# Patient Record
Sex: FEMALE | Race: WHITE | Employment: FULL TIME | URBAN - METROPOLITAN AREA
[De-identification: names, ages, dates, MRNs, and addresses within clinical notes are randomized per-mention and may not be internally consistent; named-entity substitution may affect disease eponyms.]

---

## 2023-02-24 LAB
BASOPHILS (10*3/UL) IN BLOOD BY AUTOMATED COUNT: 0.05 X10E9/L (ref 0–0.1)
BASOPHILS/100 LEUKOCYTES IN BLOOD BY AUTOMATED COUNT: 1 % (ref 0–2)
EOSINOPHILS (10*3/UL) IN BLOOD BY AUTOMATED COUNT: 0.06 X10E9/L (ref 0–0.7)
EOSINOPHILS/100 LEUKOCYTES IN BLOOD BY AUTOMATED COUNT: 1.2 % (ref 0–6)
ERYTHROCYTE DISTRIBUTION WIDTH (RATIO) BY AUTOMATED COUNT: 14.4 % (ref 11.5–14.5)
ERYTHROCYTE MEAN CORPUSCULAR HEMOGLOBIN CONCENTRATION (G/DL) BY AUTOMATED: 32.4 G/DL (ref 32–36)
ERYTHROCYTE MEAN CORPUSCULAR VOLUME (FL) BY AUTOMATED COUNT: 97 FL (ref 80–100)
ERYTHROCYTES (10*6/UL) IN BLOOD BY AUTOMATED COUNT: 4.06 X10E12/L (ref 4–5.2)
HEMATOCRIT (%) IN BLOOD BY AUTOMATED COUNT: 39.5 % (ref 36–46)
HEMOGLOBIN (G/DL) IN BLOOD: 12.8 G/DL (ref 12–16)
IMMATURE GRANULOCYTES/100 LEUKOCYTES IN BLOOD BY AUTOMATED COUNT: 0.2 % (ref 0–0.9)
LEUKOCYTES (10*3/UL) IN BLOOD BY AUTOMATED COUNT: 5.1 X10E9/L (ref 4.4–11.3)
LYMPHOCYTES (10*3/UL) IN BLOOD BY AUTOMATED COUNT: 1.43 X10E9/L (ref 1.2–4.8)
LYMPHOCYTES/100 LEUKOCYTES IN BLOOD BY AUTOMATED COUNT: 28.1 % (ref 13–44)
MONOCYTES (10*3/UL) IN BLOOD BY AUTOMATED COUNT: 0.51 X10E9/L (ref 0.1–1)
MONOCYTES/100 LEUKOCYTES IN BLOOD BY AUTOMATED COUNT: 10 % (ref 2–10)
NEUTROPHILS (10*3/UL) IN BLOOD BY AUTOMATED COUNT: 3.02 X10E9/L (ref 1.2–7.7)
NEUTROPHILS/100 LEUKOCYTES IN BLOOD BY AUTOMATED COUNT: 59.5 % (ref 40–80)
PLATELETS (10*3/UL) IN BLOOD AUTOMATED COUNT: 231 X10E9/L (ref 150–450)

## 2023-05-19 LAB
ALANINE AMINOTRANSFERASE (SGPT) (U/L) IN SER/PLAS: 19 U/L (ref 7–45)
ALBUMIN (G/DL) IN SER/PLAS: 4.9 G/DL (ref 3.4–5)
ALKALINE PHOSPHATASE (U/L) IN SER/PLAS: 84 U/L (ref 33–110)
ANION GAP IN SER/PLAS: 11 MMOL/L (ref 10–20)
ASPARTATE AMINOTRANSFERASE (SGOT) (U/L) IN SER/PLAS: 15 U/L (ref 9–39)
BETA HYDROXYBUTYRATE (MMOL/L) IN SER/PLAS: 0.15 MMOL/L (ref 0.02–0.27)
BILIRUBIN TOTAL (MG/DL) IN SER/PLAS: 0.6 MG/DL (ref 0–1.2)
C PEPTIDE (NG/ML) IN SER/PLAS: 0.9 NG/ML (ref 0.7–3.9)
CALCIUM (MG/DL) IN SER/PLAS: 9.5 MG/DL (ref 8.6–10.3)
CARBON DIOXIDE, TOTAL (MMOL/L) IN SER/PLAS: 25 MMOL/L (ref 21–32)
CHLORIDE (MMOL/L) IN SER/PLAS: 105 MMOL/L (ref 98–107)
CREATININE (MG/DL) IN SER/PLAS: 0.8 MG/DL (ref 0.5–1.05)
GFR FEMALE: >90 ML/MIN/1.73M2
GLUCOSE (MG/DL) IN SER/PLAS: 73 MG/DL (ref 74–99)
INSULIN: 6 UIU/ML (ref 3–25)
POTASSIUM (MMOL/L) IN SER/PLAS: 3.7 MMOL/L (ref 3.5–5.3)
PROTEIN TOTAL: 7.9 G/DL (ref 6.4–8.2)
SODIUM (MMOL/L) IN SER/PLAS: 137 MMOL/L (ref 136–145)
THYROTROPIN (MIU/L) IN SER/PLAS BY DETECTION LIMIT <= 0.05 MIU/L: 2.8 MIU/L (ref 0.44–3.98)
UREA NITROGEN (MG/DL) IN SER/PLAS: 9 MG/DL (ref 6–23)

## 2023-05-23 LAB — GLUTAMATE DECARBOXYLASE 65 AB (U/ML) IN SERUM: 0 NMOL/L

## 2023-05-25 LAB
ISLET ANTIGEN-2 ANTIBODY: <5.4 U/ML (ref 0–7.4)
PROINSULIN, INTACT: <1.6 PMOL/L

## 2023-05-30 LAB
CHLORPROPAMIDE: NORMAL MCG/ML
GLIMEPIRIDE: NORMAL NG/ML
GLIPIZIDE: NORMAL NG/ML
GLYBURIDE: NORMAL NG/ML
NATEGLINIDE: NORMAL MCG/ML
PIOGLITAZONE: NORMAL NG/ML
REPAGLINIDE: NORMAL NG/ML
ROSIGLITAZONE: NORMAL NG/ML
TOLAZAMIDE: NORMAL MCG/ML
TOLBUTAMIDE: NORMAL MCG/ML

## 2023-06-13 DIAGNOSIS — F41.9 ANXIETY: Primary | ICD-10-CM

## 2023-06-13 PROBLEM — K21.9 GERD (GASTROESOPHAGEAL REFLUX DISEASE): Status: ACTIVE | Noted: 2023-06-13

## 2023-06-13 PROBLEM — D72.819 WBC DECREASED: Status: RESOLVED | Noted: 2023-06-13 | Resolved: 2023-06-13

## 2023-06-13 PROBLEM — E04.9 ENLARGED THYROID: Status: ACTIVE | Noted: 2023-06-13

## 2023-06-13 PROBLEM — E06.3 HASHIMOTO'S DISEASE: Status: ACTIVE | Noted: 2023-06-13

## 2023-06-13 PROBLEM — G43.009 MIGRAINE WITHOUT AURA AND RESPONSIVE TO TREATMENT: Status: ACTIVE | Noted: 2023-06-13

## 2023-06-13 PROBLEM — R79.89 ELEVATED TSH: Status: ACTIVE | Noted: 2023-06-13

## 2023-06-13 RX ORDER — FLUOXETINE HYDROCHLORIDE 20 MG/1
1 CAPSULE ORAL DAILY
COMMUNITY
Start: 2022-10-07 | End: 2023-06-13 | Stop reason: SDUPTHER

## 2023-06-13 RX ORDER — FLUOXETINE HYDROCHLORIDE 20 MG/1
20 CAPSULE ORAL DAILY
Qty: 90 CAPSULE | Refills: 1 | Status: SHIPPED | OUTPATIENT
Start: 2023-06-13 | End: 2023-06-23

## 2023-06-23 ENCOUNTER — OFFICE VISIT (OUTPATIENT)
Dept: PRIMARY CARE | Facility: CLINIC | Age: 28
End: 2023-06-23
Payer: COMMERCIAL

## 2023-06-23 VITALS
HEIGHT: 68 IN | RESPIRATION RATE: 18 BRPM | OXYGEN SATURATION: 98 % | TEMPERATURE: 97.8 F | DIASTOLIC BLOOD PRESSURE: 82 MMHG | WEIGHT: 159 LBS | HEART RATE: 81 BPM | BODY MASS INDEX: 24.1 KG/M2 | SYSTOLIC BLOOD PRESSURE: 118 MMHG

## 2023-06-23 DIAGNOSIS — F41.9 ANXIETY: Primary | ICD-10-CM

## 2023-06-23 PROCEDURE — 99214 OFFICE O/P EST MOD 30 MIN: CPT | Performed by: FAMILY MEDICINE

## 2023-06-23 PROCEDURE — 1036F TOBACCO NON-USER: CPT | Performed by: FAMILY MEDICINE

## 2023-06-23 RX ORDER — RIZATRIPTAN BENZOATE 10 MG/1
TABLET ORAL
COMMUNITY
End: 2023-10-19 | Stop reason: SDUPTHER

## 2023-06-23 RX ORDER — CYCLOBENZAPRINE HCL 5 MG
224640 TABLET ORAL NIGHTLY
COMMUNITY
End: 2023-10-19 | Stop reason: SDUPTHER

## 2023-06-23 RX ORDER — FLUTICASONE PROPIONATE 50 MCG
2 SPRAY, SUSPENSION (ML) NASAL
COMMUNITY
Start: 2013-11-27

## 2023-06-23 RX ORDER — FLUOXETINE HYDROCHLORIDE 40 MG/1
40 CAPSULE ORAL DAILY
Qty: 30 CAPSULE | Refills: 11 | Status: SHIPPED | OUTPATIENT
Start: 2023-06-23 | End: 2024-06-22

## 2023-06-23 RX ORDER — LORATADINE 10 MG/1
1 TABLET ORAL DAILY PRN
COMMUNITY
Start: 2022-08-25

## 2023-06-23 ASSESSMENT — ENCOUNTER SYMPTOMS
HEADACHES: 0
SHORTNESS OF BREATH: 0

## 2023-06-23 NOTE — PROGRESS NOTES
"Subjective     Bertha Esposito is a 28 y.o. female who presents for Med Refill.    Med Refill  Pertinent negatives include no chest pain or headaches.      Pt is doing well. She was started on prozac 20 mg in Oct. 2022 for depression. Her anxiety has increased due to work.  She is interested in increasing dose.  She denies depression.  She has trouble sleeping due to anxiety.  No side effects to prozac.      She is getting botox for her migraines through her neurologist which has been helping.     She sees Endo NP for her low blood sugars.  She had labs done recently in May 2023.      Review of Systems   Respiratory:  Negative for shortness of breath.    Cardiovascular:  Negative for chest pain.   Neurological:  Negative for headaches.       Objective     Vitals:    06/23/23 1127   BP: 118/82   BP Location: Right arm   Patient Position: Sitting   Pulse: 81   Resp: 18   Temp: 36.6 °C (97.8 °F)   TempSrc: Temporal   SpO2: 98%   Weight: 72.1 kg (159 lb)   Height: 1.727 m (5' 8\")        Current Outpatient Medications   Medication Instructions    cyclobenzaprine (FLEXERIL) 224,640 mg, oral, Nightly    FLUoxetine (PROZAC) 40 mg, oral, Daily    fluticasone (Flonase) 50 mcg/actuation nasal spray 2 sprays, nasal, Daily RT    loratadine (Claritin) 10 mg tablet 1 tablet, oral, Daily PRN    onabotulinumtoxinA (Botox) 100 unit injection 200 units for migraine    rizatriptan (Maxalt) 10 mg tablet TAKE 1 TABLET BY MOUTH AT ONSET OF HEADACHE. MAY REPEAT EVERY 2 HOURS AS NEEDED. MAXIMUM OF 3 TABLETS IN 24 HOURS.        Past Surgical History:   Procedure Laterality Date    OTHER SURGICAL HISTORY  02/04/2021    No history of surgery        Social History     Tobacco Use    Smoking status: Never    Smokeless tobacco: Never        No family history on file.     Immunization History   Administered Date(s) Administered    HPV 9-Valent 09/15/2017    Influenza, injectable, quadrivalent, preservative free 10/07/2022    Pfizer Gray Cap " SARS-CoV-2 08/18/2022    Pfizer Purple Cap SARS-CoV-2 03/31/2021, 04/21/2021, 11/09/2021        Physical Exam  Vitals reviewed.   Constitutional:       General: She is not in acute distress.     Appearance: Normal appearance. She is well-developed.   HENT:      Head: Normocephalic and atraumatic.      Nose: Nose normal.   Eyes:      General: Lids are normal.      Extraocular Movements: Extraocular movements intact.      Conjunctiva/sclera: Conjunctivae normal.      Right eye: Right conjunctiva is not injected.      Left eye: Left conjunctiva is not injected.   Cardiovascular:      Rate and Rhythm: Normal rate and regular rhythm.      Heart sounds: No murmur heard.  Pulmonary:      Effort: Pulmonary effort is normal. No respiratory distress.      Breath sounds: Normal breath sounds. No wheezing, rhonchi or rales.   Lymphadenopathy:      Cervical: No cervical adenopathy.   Skin:     General: Skin is warm and dry.      Findings: No rash.   Neurological:      Mental Status: She is alert and oriented to person, place, and time. Mental status is at baseline.   Psychiatric:         Mood and Affect: Mood normal.         Behavior: Behavior normal.         Problem List Items Addressed This Visit       Anxiety - Primary    Relevant Medications    FLUoxetine (PROzac) 40 mg capsule       Assessment/Plan     anxiety - not well controlled, increase to 40 mg daily     migraines - sees  neuro - botox - well controlled     f/u 1 month for update

## 2023-07-10 ENCOUNTER — APPOINTMENT (OUTPATIENT)
Dept: PRIMARY CARE | Facility: CLINIC | Age: 28
End: 2023-07-10
Payer: COMMERCIAL

## 2023-10-19 DIAGNOSIS — G43.009 MIGRAINE WITHOUT AURA AND RESPONSIVE TO TREATMENT: Primary | ICD-10-CM

## 2023-10-19 RX ORDER — RIZATRIPTAN BENZOATE 10 MG/1
TABLET ORAL
Qty: 9 TABLET | Refills: 0 | Status: SHIPPED | OUTPATIENT
Start: 2023-10-19 | End: 2023-11-08 | Stop reason: SDUPTHER

## 2023-10-19 RX ORDER — CYCLOBENZAPRINE HCL 5 MG
5 TABLET ORAL NIGHTLY PRN
Qty: 30 TABLET | Refills: 0 | Status: SHIPPED | OUTPATIENT
Start: 2023-10-19 | End: 2023-11-08 | Stop reason: SDUPTHER

## 2023-10-19 NOTE — TELEPHONE ENCOUNTER
Pt called to ask if Dr. Wang would be able to refill her migraine medications flexeril and maxalt.  Her neurologist left the practice and they do not have someone for her to see.  She is trying to establish with a new neurologist but she needs the medication in the meantime.  Dr. Danie Olson was her neurologist.  If possible please send to [facility] in [place].

## 2023-10-23 ENCOUNTER — TELEPHONE (OUTPATIENT)
Dept: NEUROLOGY | Facility: CLINIC | Age: 28
End: 2023-10-23
Payer: COMMERCIAL

## 2023-10-23 DIAGNOSIS — G43.009 MIGRAINE WITHOUT AURA AND RESPONSIVE TO TREATMENT: ICD-10-CM

## 2023-10-23 RX ORDER — RIZATRIPTAN BENZOATE 10 MG/1
TABLET ORAL
Qty: 9 TABLET | Refills: 0 | OUTPATIENT
Start: 2023-10-23

## 2023-11-08 ENCOUNTER — TELEMEDICINE (OUTPATIENT)
Dept: NEUROLOGY | Facility: CLINIC | Age: 28
End: 2023-11-08
Payer: COMMERCIAL

## 2023-11-08 DIAGNOSIS — G43.009 MIGRAINE WITHOUT AURA AND RESPONSIVE TO TREATMENT: Primary | ICD-10-CM

## 2023-11-08 PROCEDURE — 99214 OFFICE O/P EST MOD 30 MIN: CPT | Performed by: NURSE PRACTITIONER

## 2023-11-08 RX ORDER — CYCLOBENZAPRINE HCL 5 MG
5-10 TABLET ORAL NIGHTLY PRN
Qty: 30 TABLET | Refills: 3 | Status: SHIPPED | OUTPATIENT
Start: 2023-11-08

## 2023-11-08 RX ORDER — RIZATRIPTAN BENZOATE 10 MG/1
TABLET ORAL
Qty: 9 TABLET | Refills: 3 | Status: SHIPPED | OUTPATIENT
Start: 2023-11-08 | End: 2024-03-01 | Stop reason: SDUPTHER

## 2023-11-08 NOTE — PROGRESS NOTES
Being assessed today for follow-up of migraine.  She reports that the intensity and frequency of her breakthrough migraines has significantly decreased.  On average should be a couple times a month until it is closer to time for her next Botox treatment.  When it comes closer to that time she does get them more frequently.  She does utilize the rizatriptan which is effective.  Due to her jaw clenching she uses the cyclobenzaprine at at bedtime to help relax the muscles so that that does not trigger migraine as well.  Continue the rizatriptan and cyclobenzaprine as she has been taking them.  Also continue her Botox as recommended.  Discussed role of medicine,  importance of taking medications, potential risks, benefits, and precautions to be taken.  Reviewed sleep hygiene and dietary modifications.  Follow-up in 6 months.    This note was created with voice recognition software and was not corrected for typographical or grammatical errors

## 2023-11-28 ENCOUNTER — APPOINTMENT (OUTPATIENT)
Dept: PRIMARY CARE | Facility: CLINIC | Age: 28
End: 2023-11-28
Payer: COMMERCIAL

## 2023-11-29 ENCOUNTER — PROCEDURE VISIT (OUTPATIENT)
Dept: NEUROLOGY | Facility: CLINIC | Age: 28
End: 2023-11-29
Payer: COMMERCIAL

## 2023-11-29 VITALS — RESPIRATION RATE: 16 BRPM | HEART RATE: 96 BPM | SYSTOLIC BLOOD PRESSURE: 108 MMHG | DIASTOLIC BLOOD PRESSURE: 70 MMHG

## 2023-11-29 DIAGNOSIS — G43.711 CHRONIC MIGRAINE WITHOUT AURA, INTRACTABLE, WITH STATUS MIGRAINOSUS: Primary | ICD-10-CM

## 2023-11-29 PROCEDURE — 64615 CHEMODENERV MUSC MIGRAINE: CPT | Performed by: PSYCHIATRY & NEUROLOGY

## 2023-11-29 RX ORDER — CETIRIZINE HYDROCHLORIDE 10 MG/1
10 TABLET ORAL DAILY PRN
COMMUNITY
End: 2023-12-15

## 2023-12-01 NOTE — PROGRESS NOTES
Patient ID: Bertha Esposito is a 28 y.o. female.    Head/Face/Jaw Botulinum Injection    Date/Time: 12/1/2023 11:10 AM    Performed by: Niya Colunga MD  Authorized by: Niya Colunga MD      Consent:      Consent obtained:  Verbal     Consent given by:  Patient    Procedure details:      EMG used?  No     Electrical stimulation used?  No     Diluted by:  Preservative free saline     Toxin (Brand):  OnaBoNT-A (Botox)     Total units available:  200       Ad hoc region injected:  Head see diagram with 200 units     Total units injected:  200     Total units wasted:  0    Post-procedure details:      Patient tolerance of procedure:  Tolerated well, no immediate complications    Comments: Please do not rub areas for 24 hours.  No pressure above eyebrows for 24 hours.  Watch out for helmets, headlamps, headbands, goggles, or massage for 24 hours.  If there is discomfort, ice for the first 24 hour,s heat after that.  Headaches may worsen, or you may experience neck stiffness.  If this occurs use your usual headache medication or a mild anti inflammatory such as advil or aleve.  Please call if you have difficulty swallowing.

## 2023-12-15 ENCOUNTER — OFFICE VISIT (OUTPATIENT)
Dept: PRIMARY CARE | Facility: CLINIC | Age: 28
End: 2023-12-15
Payer: COMMERCIAL

## 2023-12-15 VITALS
DIASTOLIC BLOOD PRESSURE: 78 MMHG | HEIGHT: 68 IN | BODY MASS INDEX: 24.1 KG/M2 | RESPIRATION RATE: 18 BRPM | OXYGEN SATURATION: 98 % | SYSTOLIC BLOOD PRESSURE: 119 MMHG | TEMPERATURE: 98.2 F | HEART RATE: 79 BPM | WEIGHT: 159 LBS

## 2023-12-15 DIAGNOSIS — F41.9 ANXIETY: Primary | ICD-10-CM

## 2023-12-15 PROCEDURE — 1036F TOBACCO NON-USER: CPT | Performed by: FAMILY MEDICINE

## 2023-12-15 PROCEDURE — 90471 IMMUNIZATION ADMIN: CPT | Performed by: FAMILY MEDICINE

## 2023-12-15 PROCEDURE — 99214 OFFICE O/P EST MOD 30 MIN: CPT | Performed by: FAMILY MEDICINE

## 2023-12-15 PROCEDURE — 90686 IIV4 VACC NO PRSV 0.5 ML IM: CPT | Performed by: FAMILY MEDICINE

## 2023-12-15 RX ORDER — HYDROXYZINE HYDROCHLORIDE 10 MG/1
10 TABLET, FILM COATED ORAL EVERY 6 HOURS PRN
Qty: 30 TABLET | Refills: 1 | Status: SHIPPED | OUTPATIENT
Start: 2023-12-15

## 2023-12-15 ASSESSMENT — ENCOUNTER SYMPTOMS
SHORTNESS OF BREATH: 0
HEADACHES: 0

## 2023-12-15 NOTE — PROGRESS NOTES
"Subjective     Bertha Esposito is a 28 y.o. female who presents for Anxiety.    Anxiety  Patient reports no chest pain or shortness of breath.        Anxiety overall has improved but patient does have episodic anxiety spells and is requesting increase in prozac or addition of an anti anxiety medication to help her as needed.      Review of Systems   Respiratory:  Negative for shortness of breath.    Cardiovascular:  Negative for chest pain.   Neurological:  Negative for headaches.       Objective     Vitals:    12/15/23 1514   BP: 119/78   BP Location: Right arm   Patient Position: Sitting   Pulse: 79   Resp: 18   Temp: 36.8 °C (98.2 °F)   TempSrc: Temporal   SpO2: 98%   Weight: 72.1 kg (159 lb)   Height: 1.727 m (5' 8\")        Current Outpatient Medications   Medication Instructions    aspirin-acetaminophen-caffeine (Excedrin Migraine) 250-250-65 mg tablet oral    cyclobenzaprine (FLEXERIL) 5-10 mg, oral, Nightly PRN    FLUoxetine (PROZAC) 40 mg, oral, Daily    fluticasone (Flonase) 50 mcg/actuation nasal spray 2 sprays, nasal, Daily RT    hydrOXYzine HCL (ATARAX) 10 mg, oral, Every 6 hours PRN    loratadine (Claritin) 10 mg tablet 1 tablet, oral, Daily PRN    onabotulinumtoxinA (Botox) 100 unit injection 200 units for migraine    rizatriptan (Maxalt) 10 mg tablet TAKE 1 TABLET BY MOUTH AT ONSET OF HEADACHE. MAY REPEAT EVERY 2 HOURS AS NEEDED. MAXIMUM OF 3 TABLETS IN 24 HOURS.        Past Surgical History:   Procedure Laterality Date    OTHER SURGICAL HISTORY  02/04/2021    No history of surgery        Social History     Tobacco Use    Smoking status: Never    Smokeless tobacco: Never        No family history on file.     Immunization History   Administered Date(s) Administered    Flu vaccine (IIV4), preservative free *Check age/dose* 10/07/2022, 12/15/2023    HPV 9-valent vaccine (GARDASIL 9) 09/15/2017    Pfizer Gray Cap SARS-CoV-2 08/18/2022    Pfizer Purple Cap SARS-CoV-2 03/31/2021, 04/21/2021, 11/09/2021    "     Physical Exam  Vitals reviewed.   Constitutional:       General: She is not in acute distress.     Appearance: Normal appearance. She is well-developed.   HENT:      Head: Normocephalic and atraumatic.   Eyes:      General: Lids are normal.      Conjunctiva/sclera:      Right eye: Right conjunctiva is not injected.      Left eye: Left conjunctiva is not injected.   Cardiovascular:      Rate and Rhythm: Normal rate and regular rhythm.      Heart sounds: No murmur heard.  Pulmonary:      Effort: Pulmonary effort is normal. No respiratory distress.      Breath sounds: Normal breath sounds. No wheezing, rhonchi or rales.   Skin:     General: Skin is warm and dry.      Findings: No rash.   Neurological:      Mental Status: She is alert and oriented to person, place, and time. Mental status is at baseline.   Psychiatric:         Mood and Affect: Mood normal.         Behavior: Behavior normal.         Problem List Items Addressed This Visit       Anxiety - Primary    Relevant Medications    hydrOXYzine HCL (Atarax) 10 mg tablet       Assessment/Plan     Anxiety - improved, but still having situational anxiety. Continue Prozac 40 mg daily;  I will start hydroxyzine 10 mg 1-2 tabs every 6 hours as needed.  The goals of therapy, medication dose, frequency and potential side effects were discussed with patient today.  The patient is agreeable to taking the medication as prescribed.       Follow up 3-4 months or sooner if needed

## 2023-12-26 DIAGNOSIS — E16.2 HYPOGLYCEMIA: ICD-10-CM

## 2023-12-27 NOTE — PROGRESS NOTES
follow up for low blood sugar readings. LV with me 05/2023.     History of Present Illness  28 y.o. female  with Hashimoto's disease (not requiring LT4 replacement) here for follow-up of low blood sugar readings.     Needs to eat every 1.5 to 2 hours  Symptoms: anxiety, coburn, headaches,dizziness, shakiness  Always thirsty  Started: last few years  Time of day: during the day  Timing in relation to food: 1.5 - 2 hours after eating     Monitoring BG with glucose meter.  Fasting high 60s-90s  During the day when eating regularly: 90s-100s  During symptoms: mid 50s to 70s     Labs from 5/19/2023 (fasting)  Serum glucose: 73  insulin: 6  c-peptide: 0.9  BHB: 0.15     TSH 2.80     Has to eat every 2 hours or becomes symptomatic.  Will have symptoms above if she does not eat for more than 2 hours.  Triggers migraines as well.  Always very thirsty (for the past several years)    ROS  General: no fever or chills  CV: no chest pain   Respiratory: no shortness of breath  MSK: no lower extremity edema  Neuro: no headache or dizziness  See HPI for Endocrine ROS    Past Medical History:   Diagnosis Date    WBC decreased 06/13/2023       Past Surgical History:   Procedure Laterality Date    OTHER SURGICAL HISTORY  02/04/2021    No history of surgery       Social History     Socioeconomic History    Marital status:      Spouse name: Not on file    Number of children: Not on file    Years of education: Not on file    Highest education level: Not on file   Occupational History    Not on file   Tobacco Use    Smoking status: Never    Smokeless tobacco: Never   Substance and Sexual Activity    Alcohol use: Not on file    Drug use: Not on file    Sexual activity: Not on file   Other Topics Concern    Not on file   Social History Narrative    Not on file     Social Determinants of Health     Financial Resource Strain: Not on file   Food Insecurity: Not on file   Transportation Needs: Not on file   Physical Activity: Not on file    Stress: Not on file   Social Connections: Not on file   Intimate Partner Violence: Not on file   Housing Stability: Not on file       Physical Exam   vitals were not taken for this visit.   General: not in acute distress  HEENT: LUZM ARIA WEBBER  Thyroid: no goiter  Neuro: alert and oriented x 3    Current Outpatient Medications   Medication Sig Dispense Refill    aspirin-acetaminophen-caffeine (Excedrin Migraine) 250-250-65 mg tablet Take by mouth.      cyclobenzaprine (Flexeril) 5 mg tablet Take 1-2 tablets (5-10 mg) by mouth as needed at bedtime for muscle spasms. 30 tablet 3    FLUoxetine (PROzac) 40 mg capsule Take 1 capsule (40 mg) by mouth once daily. 30 capsule 11    fluticasone (Flonase) 50 mcg/actuation nasal spray Administer 2 sprays into affected nostril(s) once daily.      hydrOXYzine HCL (Atarax) 10 mg tablet Take 1 tablet (10 mg) by mouth every 6 hours if needed for anxiety. 30 tablet 1    loratadine (Claritin) 10 mg tablet Take 1 tablet (10 mg) by mouth once daily as needed.      onabotulinumtoxinA (Botox) 100 unit injection 200 units for migraine      rizatriptan (Maxalt) 10 mg tablet TAKE 1 TABLET BY MOUTH AT ONSET OF HEADACHE. MAY REPEAT EVERY 2 HOURS AS NEEDED. MAXIMUM OF 3 TABLETS IN 24 HOURS. 9 tablet 3     No current facility-administered medications for this visit.       Assessment and Plan  28 y.o. female  with Hashimoto's disease (not requiring LT4 replacement) here for evaluation of hypoglycemia.     1. Low blood sugar readings  2. Hashimoto's thyroiditis (not requiring LT4 replacement)     For the past few years, she has had to eat every 1.5 to 2 hours to prevent the symptoms below:  Dizziness, anxiety, moodiness, migraines, shakiness.  Symptoms resolve within 5-10 minutes after eating.  Symptoms occur mainly during the day between meals.   Fasting hypoglycemia is not frequent, but she has a migraine at night, she will eat bread. The morning following, BG has been in the mid 50s or 60s  before.  When she is able to eat regularly, BGs are in the 90s.     She also reports being thirsty all the time. This has also been occurring for the past several years.  Has tingling in her fingertips.     Eating small frequent meals. Has carbs and protein together.  For breakfast, she has a protein shake and big bowl of overnight oats.    Labs from 5/19/2023 (fasting)  Serum glucose: 73  insulin: 6  c-peptide: 0.9  BHB: 0.15     TSH 2.80     Symptoms are most consistent with reactive hypoglycemia.  Labs from 5/19 are not interpretable for hyperinsulinemic conditions because serum blood glucose was 73 and not below 55 which is the cut off used for diagnosing true hypoglycemia.    Has not completed mixed meal test yet although my office has reached out to schedule her multiple times.  Has not scheduled with neurology for autonomic dysfunction.    Low GI diet?      PLAN:  -will schedule mixed meal test (dairy free protein shake)  -AM cortisol, DHEAS, ACTH with mixed meal labs  -refer to neurology for POTS evaluation  -low GI diet; continue small frequent meals, always pair low GI carbs with protein  -monitor TSH yearly     Follow-up in

## 2023-12-29 ENCOUNTER — TELEPHONE (OUTPATIENT)
Dept: ENDOCRINOLOGY | Facility: CLINIC | Age: 28
End: 2023-12-29
Payer: COMMERCIAL

## 2023-12-29 NOTE — TELEPHONE ENCOUNTER
Left a VM that she ended up with an awful migraine on Wednesday and couldn't make the mixed meal test. She apologizes and asks that she be contacted to be rescheduled.

## 2024-01-02 ENCOUNTER — APPOINTMENT (OUTPATIENT)
Dept: ENDOCRINOLOGY | Facility: CLINIC | Age: 29
End: 2024-01-02
Payer: COMMERCIAL

## 2024-01-04 ENCOUNTER — APPOINTMENT (OUTPATIENT)
Dept: NEUROLOGY | Facility: CLINIC | Age: 29
End: 2024-01-04
Payer: COMMERCIAL

## 2024-01-17 ENCOUNTER — CLINICAL SUPPORT (OUTPATIENT)
Dept: ENDOCRINOLOGY | Facility: CLINIC | Age: 29
End: 2024-01-17
Payer: COMMERCIAL

## 2024-01-17 DIAGNOSIS — E16.2 HYPOGLYCEMIA: Primary | ICD-10-CM

## 2024-01-17 LAB
C PEPTIDE SERPL-MCNC: 1.3 NG/ML (ref 0.7–3.9)
C PEPTIDE SERPL-MCNC: 1.4 NG/ML (ref 0.7–3.9)
C PEPTIDE SERPL-MCNC: 1.9 NG/ML (ref 0.7–3.9)
C PEPTIDE SERPL-MCNC: 2.7 NG/ML (ref 0.7–3.9)
C PEPTIDE SERPL-MCNC: 2.8 NG/ML (ref 0.7–3.9)
C PEPTIDE SERPL-MCNC: 3 NG/ML (ref 0.7–3.9)
C PEPTIDE SERPL-MCNC: 3 NG/ML (ref 0.7–3.9)
CORTIS AM PEAK SERPL-MSCNC: 8.1 UG/DL (ref 5–20)
DHEA-S SERPL-MCNC: 84 UG/DL (ref 65–395)
GLUCOSE SERPL-MCNC: 64 MG/DL (ref 74–99)
GLUCOSE SERPL-MCNC: 64 MG/DL (ref 74–99)
GLUCOSE SERPL-MCNC: 73 MG/DL (ref 74–99)
GLUCOSE SERPL-MCNC: 79 MG/DL (ref 74–99)
GLUCOSE SERPL-MCNC: 79 MG/DL (ref 74–99)
GLUCOSE SERPL-MCNC: 84 MG/DL (ref 74–99)
GLUCOSE SERPL-MCNC: 93 MG/DL (ref 74–99)
INSULIN P FAST SERPL-ACNC: 5 UIU/ML (ref 3–25)
INSULIN P FAST SERPL-ACNC: 6 UIU/ML (ref 3–25)
INSULIN SERPL-ACNC: 28 UIU/ML (ref 3–25)
INSULIN SERPL-ACNC: 29 UIU/ML (ref 3–25)
INSULIN SERPL-ACNC: 34 UIU/ML (ref 3–25)
INSULIN SERPL-ACNC: 8 UIU/ML (ref 3–25)
POC FINGERSTICK BLOOD GLUCOSE: 86 MG/DL (ref 70–100)
POC FINGERSTICK BLOOD GLUCOSE: 99 MG/DL (ref 70–100)

## 2024-01-17 PROCEDURE — 83525 ASSAY OF INSULIN: CPT

## 2024-01-17 PROCEDURE — 84681 ASSAY OF C-PEPTIDE: CPT

## 2024-01-17 PROCEDURE — 82947 ASSAY GLUCOSE BLOOD QUANT: CPT

## 2024-01-17 PROCEDURE — 82533 TOTAL CORTISOL: CPT

## 2024-01-17 PROCEDURE — 36415 COLL VENOUS BLD VENIPUNCTURE: CPT

## 2024-01-17 PROCEDURE — 82962 GLUCOSE BLOOD TEST: CPT | Performed by: STUDENT IN AN ORGANIZED HEALTH CARE EDUCATION/TRAINING PROGRAM

## 2024-01-17 PROCEDURE — 82024 ASSAY OF ACTH: CPT

## 2024-01-17 PROCEDURE — 82627 DEHYDROEPIANDROSTERONE: CPT

## 2024-01-17 PROCEDURE — 84206 ASSAY OF PROINSULIN: CPT

## 2024-01-18 LAB — ACTH PLAS-MCNC: 10.2 PG/ML (ref 7.2–63.3)

## 2024-01-19 LAB
PROINSULIN P 12H FAST SERPL-SCNC: 4.1 PMOL/L
PROINSULIN P 12H FAST SERPL-SCNC: <2 PMOL/L

## 2024-01-21 LAB — PROINSULIN P 12H FAST SERPL-SCNC: <2 PMOL/L

## 2024-01-23 NOTE — PROGRESS NOTES
" 6 week FUV first Botox 11-  States Botox helps a lot. Decreased intensity and frequency \"its life changing\"    Experiencing 4-8 migraines per month before botox was near daily   Treating migraines with Rizatriptan- states sometimes one pill works and other times it doesn't work at all. Denies side effects   Has to repeat treatment 50% of time. Also rescues with excedrin migraine     Migraine occurs right above left eye and in neck and side of head.   Associated symptoms is mainly just being nausea.  Triggers for migraines are stress, not sleeping enough and blood sugar as in if she is not eating enough, and sometimes too much processed or fried foods.     Sleeping 6-8 hours per night on average.  No trouble falling asleep  has trouble staying asleep.   States she Feels rested on awakening.     Patient states she is feeling some anxiety but is taking Prozac. States it helps enough.     Bruxism- significant improvement with botox has been able to reduce flexeril           RECALL 11-8-2023 Anita Landon visit.   Being assessed today for follow-up of migraine.  She reports that the intensity and frequency of her breakthrough migraines has significantly decreased.  On average should be a couple times a month until it is closer to time for her next Botox treatment.  When it comes closer to that time she does get them more frequently.  She does utilize the rizatriptan which is effective.  Due to her jaw clenching she uses the cyclobenzaprine at at bedtime to help relax the muscles so that that does not trigger migraine as well.  Continue the rizatriptan and cyclobenzaprine as she has been taking them.  Also continue her Botox as recommended.  Discussed role of medicine,  importance of taking medications, potential risks, benefits, and precautions to be taken.  Reviewed sleep hygiene and dietary modifications.  Follow-up in 6 months.   "

## 2024-01-24 ENCOUNTER — APPOINTMENT (OUTPATIENT)
Dept: NEUROLOGY | Facility: CLINIC | Age: 29
End: 2024-01-24
Payer: COMMERCIAL

## 2024-01-25 ENCOUNTER — OFFICE VISIT (OUTPATIENT)
Dept: NEUROLOGY | Facility: CLINIC | Age: 29
End: 2024-01-25
Payer: COMMERCIAL

## 2024-01-25 VITALS — SYSTOLIC BLOOD PRESSURE: 98 MMHG | DIASTOLIC BLOOD PRESSURE: 68 MMHG | TEMPERATURE: 97.7 F | RESPIRATION RATE: 16 BRPM

## 2024-01-25 DIAGNOSIS — G43.711 CHRONIC MIGRAINE WITHOUT AURA, INTRACTABLE, WITH STATUS MIGRAINOSUS: Primary | ICD-10-CM

## 2024-01-25 PROCEDURE — 99214 OFFICE O/P EST MOD 30 MIN: CPT | Performed by: PSYCHIATRY & NEUROLOGY

## 2024-01-25 PROCEDURE — 1036F TOBACCO NON-USER: CPT | Performed by: PSYCHIATRY & NEUROLOGY

## 2024-01-25 NOTE — PATIENT INSTRUCTIONS
Lets add nurtec for when your blood sugar is low as a pretreatment for your migraine  If the migraine pain has already started use your rizatriptan

## 2024-01-29 NOTE — PROGRESS NOTES
follow up for low blood sugar readings. LV with me 05/2023.     History of Present Illness  28 y.o. female  with Hashimoto's disease (not requiring LT4 replacement) here for follow-up of low blood sugar readings.    When sugars are low, she gets migraines, dizziness, nausea, foggy thinking, shakiness, anxiety. Using Rx bars since they are a mix of carbs/proteins/fats. Symptoms will resolve with eating, but if already has symptoms has to eat something with a lot of carbs (usually uses plain oatmeal or rice- plain carb)). May also get chipotle. Has to drink a lot of water. Drinking 8-10 24oz water bottles a day. Urine is yellow to clear.   Symptoms mostly occur in morning if she doesn't eat right away. Has to have a snack between breakfast and lunch. After lunch. If not eating every 1.5-2 hours. 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     Breakfast: overnight oats with PB2 and protein. Lunch: pumpkin seeds, Rx bar, and garbanzo bean pasta with red sauce. For dinner, had salad, eggs, and toast. Doesn't eat a lot of meat other than chicken and fish. Dairy allergy and tries to eat gluten free as well (no Celiac, but does have sensitivity). Does pilates and cardio. Stopped running because it worsened her symptoms.     Grandfather has diabetes. Grandmother, mother, and sister have blood sugar issues and have to eat often.   Has gained weight since not running. Always cold. Palpitations resolved now that she is on anxiety medications. No dry skin or hair loss. Used to be very regular with bowel movements, now goes 3-4x/week. Just feels like she can't go.     Not currently checking blood sugars.     Forgot about referral to neurology for POTS evaluation.     Denies fevers, chills, syncope, chest pain, shortness of breath, cough, abdominal pain, vomiting, diarrhea, numbness/tingling in arms or legs.      Needs to eat every 1.5 to 2 hours  Symptoms: anxiety, coburn, headaches,dizziness, shakiness  Always  thirsty  Started: last few years  Time of day: during the day  Timing in relation to food: 1.5 - 2 hours after eating     Monitoring BG with glucose meter.  Fasting high 60s-90s  During the day when eating regularly: 90s-100s  During symptoms: mid 50s to 70s     Labs from 5/19/2023 (fasting)  Serum glucose: 73  insulin: 6  c-peptide: 0.9  BHB: 0.15     TSH 2.80     Has to eat every 2 hours or becomes symptomatic.  Will have symptoms above if she does not eat for more than 2 hours.  Triggers migraines as well.  Always very thirsty (for the past several years)    ROS  General: no fever or chills  CV: no chest pain   Respiratory: no shortness of breath  MSK: no lower extremity edema  Neuro: no headache or dizziness  See HPI for Endocrine ROS    Past Medical History:   Diagnosis Date    WBC decreased 06/13/2023       Past Surgical History:   Procedure Laterality Date    OTHER SURGICAL HISTORY  02/04/2021    No history of surgery       Social History     Socioeconomic History    Marital status:      Spouse name: Not on file    Number of children: Not on file    Years of education: Not on file    Highest education level: Not on file   Occupational History    Not on file   Tobacco Use    Smoking status: Never    Smokeless tobacco: Never   Substance and Sexual Activity    Alcohol use: Yes    Drug use: Never    Sexual activity: Defer   Other Topics Concern    Not on file   Social History Narrative    Not on file     Social Determinants of Health     Financial Resource Strain: Not on file   Food Insecurity: Not on file   Transportation Needs: Not on file   Physical Activity: Not on file   Stress: Not on file   Social Connections: Not on file   Intimate Partner Violence: Not on file   Housing Stability: Not on file       Physical Exam   vitals were not taken for this visit.   General: not in acute distress  HEENT: AKBAR, EOMI  Thyroid: no goiter  Neuro: alert and oriented x 3    Current Outpatient Medications    Medication Sig Dispense Refill    aspirin-acetaminophen-caffeine (Excedrin Migraine) 250-250-65 mg tablet Take by mouth.      cyclobenzaprine (Flexeril) 5 mg tablet Take 1-2 tablets (5-10 mg) by mouth as needed at bedtime for muscle spasms. 30 tablet 3    FLUoxetine (PROzac) 40 mg capsule Take 1 capsule (40 mg) by mouth once daily. 30 capsule 11    fluticasone (Flonase) 50 mcg/actuation nasal spray Administer 2 sprays into affected nostril(s) once daily.      hydrOXYzine HCL (Atarax) 10 mg tablet Take 1 tablet (10 mg) by mouth every 6 hours if needed for anxiety. 30 tablet 1    loratadine (Claritin) 10 mg tablet Take 1 tablet (10 mg) by mouth once daily as needed.      onabotulinumtoxinA (Botox) 100 unit injection 200 units for migraine      rimegepant (Nurtec ODT) 75 mg tablet,disintegrating Take 1 tablet (75 mg) by mouth once daily as needed (migraine). 16 tablet 3    rizatriptan (Maxalt) 10 mg tablet TAKE 1 TABLET BY MOUTH AT ONSET OF HEADACHE. MAY REPEAT EVERY 2 HOURS AS NEEDED. MAXIMUM OF 3 TABLETS IN 24 HOURS. 9 tablet 3     No current facility-administered medications for this visit.      Latest Reference Range & Units 01/17/24 09:30 01/17/24 10:10 01/17/24 10:25 01/17/24 10:54 01/17/24 11:29 01/17/24 12:30   GLUCOSE 74 - 99 mg/dL 79 64 (L) 64 (L) 73 (L) 79 93   C-Peptide 0.7 - 3.9 ng/mL 1.3 2.7 3.0 3.0 2.8 1.9   Insulin 3 - 25 uIU/mL  34 (H) 29 (H) 28 (H)     Cortisol  A.M. 5.0 - 20.0 ug/dL 8.1        Proinsulin, Intact <=7.2 pmol/L <2.0 <2.0 <2.0 <2.0 4.1 <2.0     Assessment and Plan  28 y.o. female  with Hashimoto's disease (not requiring LT4 replacement) here for follow-up of low blood glucose readings.     1. Low blood sugar readings  2. Hashimoto's thyroiditis (not requiring LT4 replacement)     For the past few years, she has had to eat every 1.5 to 2 hours to prevent the symptoms below:  Dizziness, anxiety, moodiness, migraines, shakiness.  Symptoms resolve within 5-10 minutes after  eating.  Symptoms occur mainly during the day between meals.   Fasting hypoglycemia is not frequent, but she has a migraine at night, she will eat bread. The morning following, BG has been in the mid 50s or 60s before.  When she is able to eat regularly, BGs are in the 90s.     She also reports being thirsty all the time. This has also been occurring for the past several years. Has tingling in her fingertips.     Eating small frequent meals. Has carbs and protein together.  For breakfast, she has a protein shake and big bowl of overnight oats.    Labs from 5/19/2023 (fasting)  Serum glucose: 73  insulin: 6  c-peptide: 0.9  BHB: 0.15     TSH 2.80     Mixed meal test demonstrates a reactive hypoglycemia-like pattern (lowest BG was 64).   In combination with a low GI diet, will consider medications that have been beneficial in reactive hypoglycemia such as acarbose, DPP-4 inhibitors, and GLP-1 RA. The latter 2 are not FDA approved for reactive hypoglycemia so may be difficult to obtain through insurance.    Will start with a trial of acarbose as this is likely the most affordable option. Explained that bloating is a common side effect. Discussed that dose titration may be necessary if she tolerates this medication.  Sitagliptin may be beneficial but more difficult to obtain (insurance coverage). Will consider this as a next step.  GLP-1RA are not approved for patients without type 2 diabetes. If a trial of one of the GLP-1 RA helps significantly with reducing hypoglycemia symptoms, I will consider an appeal to insurance to obtain this medication.    PLAN:  -start acarbose 25mg TID before meals  -advised patient to update me in 2 weeks regarding efficacy of acarbose  -referred to neurology for POTS evaluation  -continue low GI diet; continue small frequent meals, always pair low GI carbs with protein (given printout with examples today)  -monitor TSH yearly      Follow-up in 4 months    I saw and evaluated the patient.  I personally obtained the key and critical portions of the history and physical exam or was physically present for key and critical portions performed by the resident/fellow. I reviewed the resident/fellow's documentation and discussed the patient with the resident/fellow. I agree with the resident/fellow's medical decision making as documented in the note.    Dana Dennis MD

## 2024-01-31 ENCOUNTER — OFFICE VISIT (OUTPATIENT)
Dept: ENDOCRINOLOGY | Facility: CLINIC | Age: 29
End: 2024-01-31
Payer: COMMERCIAL

## 2024-01-31 VITALS
RESPIRATION RATE: 16 BRPM | DIASTOLIC BLOOD PRESSURE: 70 MMHG | WEIGHT: 157.5 LBS | HEART RATE: 87 BPM | SYSTOLIC BLOOD PRESSURE: 124 MMHG | TEMPERATURE: 98.1 F | BODY MASS INDEX: 23.95 KG/M2

## 2024-01-31 DIAGNOSIS — E16.1 REACTIVE HYPOGLYCEMIA: Primary | ICD-10-CM

## 2024-01-31 PROCEDURE — 1036F TOBACCO NON-USER: CPT | Performed by: STUDENT IN AN ORGANIZED HEALTH CARE EDUCATION/TRAINING PROGRAM

## 2024-01-31 PROCEDURE — 99215 OFFICE O/P EST HI 40 MIN: CPT | Performed by: STUDENT IN AN ORGANIZED HEALTH CARE EDUCATION/TRAINING PROGRAM

## 2024-01-31 RX ORDER — ACARBOSE 25 MG/1
25 TABLET ORAL
Qty: 270 TABLET | Refills: 3 | Status: SHIPPED | OUTPATIENT
Start: 2024-01-31 | End: 2024-02-20 | Stop reason: SINTOL

## 2024-01-31 ASSESSMENT — PAIN SCALES - GENERAL: PAINLEVEL: 0-NO PAIN

## 2024-02-07 ENCOUNTER — APPOINTMENT (OUTPATIENT)
Dept: ENDOCRINOLOGY | Facility: CLINIC | Age: 29
End: 2024-02-07
Payer: COMMERCIAL

## 2024-02-15 ENCOUNTER — TELEPHONE (OUTPATIENT)
Dept: NEUROLOGY | Facility: CLINIC | Age: 29
End: 2024-02-15
Payer: COMMERCIAL

## 2024-02-15 NOTE — TELEPHONE ENCOUNTER
Spoke with Bertha ta. Trials of triptans in the past.   Currently using Rizatriptan to treat.   Has tried sumatriptan tablets in the past as well as injections.   Sumatriptan tablets were not effective and caused tightening in throat.   Would like trial of Nurtec for low side effect profile.   Will retry PA for Nurtec with adequate oral triptan failures.

## 2024-02-20 ENCOUNTER — TELEPHONE (OUTPATIENT)
Dept: ENDOCRINOLOGY | Facility: CLINIC | Age: 29
End: 2024-02-20
Payer: COMMERCIAL

## 2024-02-20 DIAGNOSIS — E88.819 INSULIN RESISTANCE: Primary | ICD-10-CM

## 2024-02-20 DIAGNOSIS — E16.1 REACTIVE HYPOGLYCEMIA: ICD-10-CM

## 2024-02-28 ENCOUNTER — PROCEDURE VISIT (OUTPATIENT)
Dept: NEUROLOGY | Facility: CLINIC | Age: 29
End: 2024-02-28
Payer: COMMERCIAL

## 2024-02-28 VITALS
WEIGHT: 150.2 LBS | DIASTOLIC BLOOD PRESSURE: 64 MMHG | TEMPERATURE: 98.6 F | SYSTOLIC BLOOD PRESSURE: 102 MMHG | BODY MASS INDEX: 22.84 KG/M2

## 2024-02-28 DIAGNOSIS — G43.711 INTRACTABLE CHRONIC MIGRAINE WITHOUT AURA AND WITH STATUS MIGRAINOSUS: Primary | ICD-10-CM

## 2024-02-28 PROCEDURE — 64615 CHEMODENERV MUSC MIGRAINE: CPT | Performed by: PSYCHIATRY & NEUROLOGY

## 2024-02-28 NOTE — PROGRESS NOTES
Patient ID: Bertha Esposito is a 28 y.o. female.    Head/Face/Jaw Botulinum Injection    Date/Time: 2/28/2024 1:54 PM    Performed by: Niya Colunga MD  Authorized by: Niya Colunga MD      Consent:      Consent obtained:  Verbal     Consent given by:  Patient    Procedure details:      EMG used?  No     Electrical stimulation used?  No     Diluted by:  Preservative free saline     Toxin (Brand):  OnaBoNT-A (Botox)     Total units available:  200       Ad hoc region injected:  Head see diagram with 200 units     Total units injected:  200     Total units wasted:  0    Post-procedure details:      Patient tolerance of procedure:  Tolerated well, no immediate complications    Comments: Please do not rub areas for 24 hours.  No pressure above eyebrows for 24 hours.  Watch out for helmets, headlamps, headbands, goggles, or massage for 24 hours.  If there is discomfort, ice for the first 24 hour,s heat after that.  Headaches may worsen, or you may experience neck stiffness.  If this occurs use your usual headache medication or a mild anti inflammatory such as advil or aleve.  Please call if you have difficulty swallowing.

## 2024-03-01 ENCOUNTER — TELEPHONE (OUTPATIENT)
Dept: NEUROLOGY | Facility: CLINIC | Age: 29
End: 2024-03-01
Payer: COMMERCIAL

## 2024-03-01 DIAGNOSIS — G43.009 MIGRAINE WITHOUT AURA AND RESPONSIVE TO TREATMENT: ICD-10-CM

## 2024-03-01 RX ORDER — RIZATRIPTAN BENZOATE 10 MG/1
TABLET ORAL
Qty: 9 TABLET | Refills: 3 | Status: CANCELLED | OUTPATIENT
Start: 2024-03-01

## 2024-03-01 NOTE — TELEPHONE ENCOUNTER
Called to refill rizatriptan (Maxalt) 10 mg tablet.  Pharmacy is   [id number] - [place], [place] - [address] Phone: [phone]        She said that the Pharmacist Said to CALL it in Would be best

## 2024-03-04 RX ORDER — RIZATRIPTAN BENZOATE 10 MG/1
TABLET ORAL
Qty: 9 TABLET | Refills: 3 | Status: SHIPPED | OUTPATIENT
Start: 2024-03-04 | End: 2024-04-11 | Stop reason: SDUPTHER

## 2024-03-06 ENCOUNTER — APPOINTMENT (OUTPATIENT)
Dept: ENDOCRINOLOGY | Facility: CLINIC | Age: 29
End: 2024-03-06
Payer: COMMERCIAL

## 2024-03-29 ENCOUNTER — TELEPHONE (OUTPATIENT)
Dept: NEUROLOGY | Facility: CLINIC | Age: 29
End: 2024-03-29
Payer: COMMERCIAL

## 2024-03-29 NOTE — TELEPHONE ENCOUNTER
----- Message from Lety Rayo MA sent at 3/29/2024 10:19 AM EDT -----  Bertha called and stated she got [facility] approved through her insurance and she was under the impression she was getting 24 pills not 16 and when she called her insurance they told her they needed more information on the dosage?

## 2024-03-29 NOTE — TELEPHONE ENCOUNTER
Spoke with Bertha ta. Quantity of Nurtec should be 16 per 30 days. That is what insurance approval is for. She received other info from insurance but thinks pharmacy sent a notification for refill. She will check with pharmacy.   She is using 3-4 per month so no risk of running out.

## 2024-04-11 ENCOUNTER — OFFICE VISIT (OUTPATIENT)
Dept: NEUROLOGY | Facility: CLINIC | Age: 29
End: 2024-04-11
Payer: COMMERCIAL

## 2024-04-11 VITALS — RESPIRATION RATE: 16 BRPM | HEART RATE: 84 BPM | DIASTOLIC BLOOD PRESSURE: 66 MMHG | SYSTOLIC BLOOD PRESSURE: 106 MMHG

## 2024-04-11 DIAGNOSIS — G43.711 CHRONIC MIGRAINE WITHOUT AURA, INTRACTABLE, WITH STATUS MIGRAINOSUS: ICD-10-CM

## 2024-04-11 DIAGNOSIS — E88.819 INSULIN RESISTANCE: ICD-10-CM

## 2024-04-11 DIAGNOSIS — E16.1 REACTIVE HYPOGLYCEMIA: ICD-10-CM

## 2024-04-11 DIAGNOSIS — G43.009 MIGRAINE WITHOUT AURA AND RESPONSIVE TO TREATMENT: ICD-10-CM

## 2024-04-11 PROCEDURE — 1036F TOBACCO NON-USER: CPT | Performed by: PSYCHIATRY & NEUROLOGY

## 2024-04-11 PROCEDURE — 99213 OFFICE O/P EST LOW 20 MIN: CPT | Performed by: PSYCHIATRY & NEUROLOGY

## 2024-04-11 RX ORDER — RIZATRIPTAN BENZOATE 10 MG/1
TABLET ORAL
Qty: 18 TABLET | Refills: 3 | Status: SHIPPED | OUTPATIENT
Start: 2024-04-11

## 2024-04-11 ASSESSMENT — PATIENT HEALTH QUESTIONNAIRE - PHQ9
1. LITTLE INTEREST OR PLEASURE IN DOING THINGS: NOT AT ALL
SUM OF ALL RESPONSES TO PHQ9 QUESTIONS 1 AND 2: 0
2. FEELING DOWN, DEPRESSED OR HOPELESS: NOT AT ALL

## 2024-04-11 NOTE — PROGRESS NOTES
Botox every 90 days. Last Botox 2- very helpful. Prior to Botox was experiencing 20 migraine days per month  Experiencing early wear off at 8 weeks. Feels completely gone by 10 weeks and migraines return to baseline.   Would like to pursue every 60 days Botox for better migraine coverage and productivity    Treating 9 migraines per month currently.    Nurtec has been very helpful for migraine treatment. Resolves completely in 2.5 hours.   Rizatriptan works faster if it will work, but doesn't not always work any longer. No side effect. Usually has to repeat and only using when migraine very early. Combined the 2 last night and worked well together,     Migraine occurs above left eye and temples. IF severe radiates to back of head  Associated light and nosier sensitivity, nausea.   Triggers are low blood sugar, stress, lack of sleep.     Sleep is 6-8 hours per night. Likes to be closer to 8    Fluoxetine controls anxiety well. Hydroxyzine PRN. Takes  about 1 time per week before  big meetings.     Patient had 15 headache days a month or more, 8 meeting migraine criteria. They had tried and failed 3 preventative and 3 abortives. Presently their headaches are well controlled because of Botox Therapy. It has reduced the headaches by 50%.

## 2024-04-19 NOTE — PROGRESS NOTES
Patient here for mixed meal test . Has fasted since last evening. POCT glucose done fist, then heplock placed in right antecubital space for the multiple blood draws that will be required every 30 minutes. Patient drank the protein/glucose drink instructed to get by Dr. Dennis for the test. Labs were then drawn every 1/2 hour post drink. Final POCT glucose drawn and heplock d/c'd. Site covered with band aid and stretch band aid over site. Informed that Dr. Dennis will discuss results and possible changes or addition to meds at next visit. DERIK Shah RN

## 2024-05-20 ENCOUNTER — APPOINTMENT (OUTPATIENT)
Dept: NEUROLOGY | Facility: CLINIC | Age: 29
End: 2024-05-20
Payer: COMMERCIAL

## 2024-05-21 DIAGNOSIS — E88.819 INSULIN RESISTANCE: ICD-10-CM

## 2024-05-21 DIAGNOSIS — E16.1 REACTIVE HYPOGLYCEMIA: Primary | ICD-10-CM

## 2024-05-21 RX ORDER — PEN NEEDLE, DIABETIC 30 GX3/16"
NEEDLE, DISPOSABLE MISCELLANEOUS
Qty: 50 EACH | Refills: 5 | Status: SHIPPED | OUTPATIENT
Start: 2024-05-21

## 2024-05-21 RX ORDER — LIRAGLUTIDE 6 MG/ML
INJECTION SUBCUTANEOUS
Qty: 6 ML | Refills: 5 | Status: SHIPPED | OUTPATIENT
Start: 2024-05-21 | End: 2024-06-04 | Stop reason: WASHOUT

## 2024-05-22 ENCOUNTER — PROCEDURE VISIT (OUTPATIENT)
Dept: NEUROLOGY | Facility: CLINIC | Age: 29
End: 2024-05-22
Payer: COMMERCIAL

## 2024-05-22 VITALS
BODY MASS INDEX: 22.81 KG/M2 | SYSTOLIC BLOOD PRESSURE: 100 MMHG | DIASTOLIC BLOOD PRESSURE: 60 MMHG | WEIGHT: 150 LBS | RESPIRATION RATE: 16 BRPM | HEART RATE: 72 BPM

## 2024-05-22 DIAGNOSIS — G43.711 INTRACTABLE CHRONIC MIGRAINE WITHOUT AURA AND WITH STATUS MIGRAINOSUS: Primary | ICD-10-CM

## 2024-05-22 PROCEDURE — 64615 CHEMODENERV MUSC MIGRAINE: CPT | Performed by: PSYCHIATRY & NEUROLOGY

## 2024-05-22 NOTE — PROGRESS NOTES
Patient ID: Bertha Esposito is a 28 y.o. female.    Head/Face/Jaw Botulinum Injection    Date/Time: 5/22/2024 1:20 PM    Performed by: Niya Colunga MD  Authorized by: Niya Colunga MD      Consent:      Consent obtained:  Verbal     Consent given by:  Patient    Procedure details:      EMG used?  No     Electrical stimulation used?  No     Diluted by:  Preservative free saline     Toxin (Brand):  OnaBoNT-A (Botox)     Total units available:  200       Ad hoc region injected:  Head see diagram with 200 units     Total units injected:  200     Total units wasted:  0    Post-procedure details:      Patient tolerance of procedure:  Tolerated well, no immediate complications

## 2024-05-23 ENCOUNTER — TELEPHONE (OUTPATIENT)
Dept: ENDOCRINOLOGY | Facility: CLINIC | Age: 29
End: 2024-05-23
Payer: COMMERCIAL

## 2024-05-23 NOTE — TELEPHONE ENCOUNTER
Prior Auth for victoza pending determination  Submitted on 5/23 via [facility]    KEY: [id number]

## 2024-06-04 DIAGNOSIS — E88.819 INSULIN RESISTANCE: Primary | ICD-10-CM

## 2024-06-04 RX ORDER — METFORMIN HYDROCHLORIDE 500 MG/1
TABLET, EXTENDED RELEASE ORAL
Qty: 60 TABLET | Refills: 5 | Status: SHIPPED | OUTPATIENT
Start: 2024-06-06

## 2024-07-09 ENCOUNTER — APPOINTMENT (OUTPATIENT)
Dept: ENDOCRINOLOGY | Facility: CLINIC | Age: 29
End: 2024-07-09
Payer: COMMERCIAL

## 2024-07-16 DIAGNOSIS — F41.9 ANXIETY: ICD-10-CM

## 2024-07-16 RX ORDER — FLUOXETINE HYDROCHLORIDE 40 MG/1
CAPSULE ORAL
Qty: 30 CAPSULE | Refills: 1 | Status: SHIPPED | OUTPATIENT
Start: 2024-07-16

## 2024-07-17 ENCOUNTER — APPOINTMENT (OUTPATIENT)
Dept: NEUROLOGY | Facility: CLINIC | Age: 29
End: 2024-07-17
Payer: COMMERCIAL

## 2024-07-22 ENCOUNTER — APPOINTMENT (OUTPATIENT)
Dept: NEUROLOGY | Facility: CLINIC | Age: 29
End: 2024-07-22
Payer: COMMERCIAL

## 2024-07-22 VITALS
WEIGHT: 150 LBS | HEART RATE: 76 BPM | RESPIRATION RATE: 16 BRPM | BODY MASS INDEX: 22.81 KG/M2 | DIASTOLIC BLOOD PRESSURE: 64 MMHG | SYSTOLIC BLOOD PRESSURE: 100 MMHG

## 2024-07-22 DIAGNOSIS — G43.711 INTRACTABLE CHRONIC MIGRAINE WITHOUT AURA AND WITH STATUS MIGRAINOSUS: Primary | ICD-10-CM

## 2024-07-22 PROCEDURE — 64615 CHEMODENERV MUSC MIGRAINE: CPT | Performed by: PSYCHIATRY & NEUROLOGY

## 2024-07-22 NOTE — PROGRESS NOTES
Patient ID: Bertha Esposito is a 29 y.o. female.    Head/Face/Jaw Botulinum Injection    Date/Time: 7/22/2024 5:25 PM    Performed by: Niya Colunga MD  Authorized by: Niya Colunga MD      Consent:      Consent obtained:  Verbal     Consent given by:  Patient    Procedure details:      EMG used?  No     Electrical stimulation used?  No     Diluted by:  Preservative free saline     Toxin (Brand):  OnaBoNT-A (Botox)     Total units available:  200       Ad hoc region injected:  Head see diagram with 200 units     Total units injected:  200     Total units wasted:  0    Post-procedure details:      Patient tolerance of procedure:  Tolerated well, no immediate complications    Comments: Please do not rub areas for 24 hours.  No pressure above eyebrows for 24 hours.  Watch out for helmets, headlamps, headbands, goggles, or massage for 24 hours.  If there is discomfort, ice for the first 24 hour,s heat after that.  Headaches may worsen, or you may experience neck stiffness.  If this occurs use your usual headache medication or a mild anti inflammatory such as advil or aleve.  Please call if you have difficulty swallowing.

## 2024-09-13 ENCOUNTER — APPOINTMENT (OUTPATIENT)
Dept: PRIMARY CARE | Facility: CLINIC | Age: 29
End: 2024-09-13
Payer: COMMERCIAL

## 2024-09-18 DIAGNOSIS — F41.9 ANXIETY: ICD-10-CM

## 2024-09-18 RX ORDER — FLUOXETINE HYDROCHLORIDE 40 MG/1
CAPSULE ORAL
Qty: 30 CAPSULE | Refills: 0 | Status: SHIPPED | OUTPATIENT
Start: 2024-09-18 | End: 2024-09-19 | Stop reason: SDUPTHER

## 2024-09-19 DIAGNOSIS — F41.9 ANXIETY: ICD-10-CM

## 2024-09-19 RX ORDER — FLUOXETINE HYDROCHLORIDE 40 MG/1
40 CAPSULE ORAL DAILY
Qty: 30 CAPSULE | Refills: 1 | Status: SHIPPED | OUTPATIENT
Start: 2024-09-19

## 2024-09-19 NOTE — TELEPHONE ENCOUNTER
PT scheduled for 11/1/24.  She needed a Friday appointment.  She wants to make sure he will send one more 30 day to get her to that appointment

## 2024-10-02 ENCOUNTER — APPOINTMENT (OUTPATIENT)
Dept: NEUROLOGY | Facility: CLINIC | Age: 29
End: 2024-10-02
Payer: COMMERCIAL

## 2024-10-02 VITALS
RESPIRATION RATE: 16 BRPM | WEIGHT: 150 LBS | BODY MASS INDEX: 22.81 KG/M2 | SYSTOLIC BLOOD PRESSURE: 100 MMHG | DIASTOLIC BLOOD PRESSURE: 70 MMHG | HEART RATE: 68 BPM

## 2024-10-02 DIAGNOSIS — G43.711 INTRACTABLE CHRONIC MIGRAINE WITHOUT AURA AND WITH STATUS MIGRAINOSUS: ICD-10-CM

## 2024-10-02 DIAGNOSIS — G43.009 MIGRAINE WITHOUT AURA AND RESPONSIVE TO TREATMENT: ICD-10-CM

## 2024-10-02 DIAGNOSIS — F41.9 ANXIETY: ICD-10-CM

## 2024-10-02 DIAGNOSIS — R11.0 NAUSEA: ICD-10-CM

## 2024-10-02 PROCEDURE — 64615 CHEMODENERV MUSC MIGRAINE: CPT | Performed by: PSYCHIATRY & NEUROLOGY

## 2024-10-02 PROCEDURE — 96372 THER/PROPH/DIAG INJ SC/IM: CPT | Performed by: PSYCHIATRY & NEUROLOGY

## 2024-10-02 RX ORDER — PROMETHAZINE HYDROCHLORIDE 25 MG/ML
25 INJECTION, SOLUTION INTRAMUSCULAR; INTRAVENOUS ONCE
Status: COMPLETED | OUTPATIENT
Start: 2024-10-02 | End: 2024-10-02

## 2024-10-02 RX ORDER — RIZATRIPTAN BENZOATE 10 MG/1
TABLET ORAL
Qty: 18 TABLET | Refills: 0 | Status: SHIPPED | OUTPATIENT
Start: 2024-10-02

## 2024-10-02 RX ORDER — KETOROLAC TROMETHAMINE 10 MG/1
10 TABLET, FILM COATED ORAL EVERY 6 HOURS
Qty: 20 TABLET | Refills: 0 | Status: SHIPPED | OUTPATIENT
Start: 2024-10-02 | End: 2024-10-07

## 2024-10-02 RX ORDER — HYDROXYZINE HYDROCHLORIDE 10 MG/1
TABLET, FILM COATED ORAL
Qty: 30 TABLET | Refills: 0 | Status: SHIPPED | OUTPATIENT
Start: 2024-10-02

## 2024-10-02 RX ORDER — ONDANSETRON 4 MG/1
4 TABLET, FILM COATED ORAL EVERY 8 HOURS PRN
Qty: 30 TABLET | Refills: 3 | Status: SHIPPED | OUTPATIENT
Start: 2024-10-02

## 2024-10-02 RX ORDER — KETOROLAC TROMETHAMINE 30 MG/ML
60 INJECTION, SOLUTION INTRAMUSCULAR; INTRAVENOUS ONCE
Status: COMPLETED | OUTPATIENT
Start: 2024-10-02 | End: 2024-10-02

## 2024-10-02 NOTE — PROGRESS NOTES
Patient ID: Bertha Esposito is a 29 y.o. female.    Head/Face/Jaw Botulinum Injection    Date/Time: 10/2/2024 2:10 PM    Performed by: Niya Colunga MD  Authorized by: Niya Colunga MD      Consent:      Consent obtained:  Verbal     Consent given by:  Patient    Pre-procedure details:      Preparation:  Phenergan IM    Procedure details:      EMG used?  No     Electrical stimulation used?  No     Diluted by:  Preservative free saline     Toxin (Brand):  OnaBoNT-A (Botox)     Total units available:  200       Ad hoc region injected:  Head see diagram with 200 units     Total units injected:  200     Total units wasted:  0    Post-procedure details:      Patient tolerance of procedure:  Tolerated well, no immediate complications    Comments: Please do not rub areas for 24 hours.  No pressure above eyebrows for 24 hours.  Watch out for helmets, headlamps, headbands, goggles, or massage for 24 hours.  If there is discomfort, ice for the first 24 hour,s heat after that.  Headaches may worsen, or you may experience neck stiffness.  If this occurs use your usual headache medication or a mild anti inflammatory such as advil or aleve.  Please call if you have difficulty swallowing.    You received Toradol today for your severe headache.  We are going to continue with 5 day of pills starting tomorrow to break your headache cycle.  Take 1 pill with breakfast lunch dinner and bedtime for 5 days.  Take with food.  Try not to take your triptan or antiinflammatory during this time.  If you have any nausea or diarrhea with the medicine stop and call on the next business day.

## 2024-10-02 NOTE — TELEPHONE ENCOUNTER
Last visit 4/11/24  No future appointments scheduled. Here today for botox will schedule one     Sent to MD

## 2024-10-24 DIAGNOSIS — F41.9 ANXIETY: ICD-10-CM

## 2024-10-24 RX ORDER — HYDROXYZINE HYDROCHLORIDE 10 MG/1
TABLET, FILM COATED ORAL
Qty: 30 TABLET | Refills: 0 | Status: SHIPPED | OUTPATIENT
Start: 2024-10-24

## 2024-10-29 ENCOUNTER — APPOINTMENT (OUTPATIENT)
Dept: ENDOCRINOLOGY | Facility: CLINIC | Age: 29
End: 2024-10-29
Payer: COMMERCIAL

## 2024-11-01 ENCOUNTER — APPOINTMENT (OUTPATIENT)
Dept: PRIMARY CARE | Facility: CLINIC | Age: 29
End: 2024-11-01
Payer: COMMERCIAL

## 2024-11-01 VITALS
SYSTOLIC BLOOD PRESSURE: 106 MMHG | HEART RATE: 88 BPM | OXYGEN SATURATION: 98 % | DIASTOLIC BLOOD PRESSURE: 66 MMHG | BODY MASS INDEX: 24.1 KG/M2 | RESPIRATION RATE: 18 BRPM | WEIGHT: 159 LBS | HEIGHT: 68 IN | TEMPERATURE: 97.5 F

## 2024-11-01 DIAGNOSIS — E16.2 HYPOGLYCEMIA WITHOUT DIAGNOSIS OF DIABETES MELLITUS: ICD-10-CM

## 2024-11-01 DIAGNOSIS — F41.9 ANXIETY: Primary | ICD-10-CM

## 2024-11-01 DIAGNOSIS — Z00.00 HEALTHCARE MAINTENANCE: ICD-10-CM

## 2024-11-01 DIAGNOSIS — E06.3 HASHIMOTO'S DISEASE: ICD-10-CM

## 2024-11-01 DIAGNOSIS — G43.009 MIGRAINE WITHOUT AURA AND RESPONSIVE TO TREATMENT: ICD-10-CM

## 2024-11-01 PROBLEM — Z91.011 DAIRY ALLERGY: Status: ACTIVE | Noted: 2024-11-01

## 2024-11-01 PROBLEM — K21.9 GERD (GASTROESOPHAGEAL REFLUX DISEASE): Status: RESOLVED | Noted: 2023-06-13 | Resolved: 2024-11-01

## 2024-11-01 PROBLEM — E04.9 ENLARGED THYROID: Status: RESOLVED | Noted: 2023-06-13 | Resolved: 2024-11-01

## 2024-11-01 PROBLEM — R79.89 ELEVATED TSH: Status: RESOLVED | Noted: 2023-06-13 | Resolved: 2024-11-01

## 2024-11-01 PROCEDURE — 1036F TOBACCO NON-USER: CPT | Performed by: FAMILY MEDICINE

## 2024-11-01 PROCEDURE — 3008F BODY MASS INDEX DOCD: CPT | Performed by: FAMILY MEDICINE

## 2024-11-01 PROCEDURE — 99213 OFFICE O/P EST LOW 20 MIN: CPT | Performed by: FAMILY MEDICINE

## 2024-11-01 ASSESSMENT — ENCOUNTER SYMPTOMS: SHORTNESS OF BREATH: 0

## 2024-11-04 DIAGNOSIS — E88.819 INSULIN RESISTANCE: ICD-10-CM

## 2024-11-04 RX ORDER — METFORMIN HYDROCHLORIDE 500 MG/1
TABLET, EXTENDED RELEASE ORAL
Qty: 90 TABLET | Refills: 5 | Status: SHIPPED | OUTPATIENT
Start: 2024-11-04

## 2024-11-07 ENCOUNTER — APPOINTMENT (OUTPATIENT)
Dept: ENDOCRINOLOGY | Facility: CLINIC | Age: 29
End: 2024-11-07
Payer: COMMERCIAL

## 2024-11-22 ENCOUNTER — APPOINTMENT (OUTPATIENT)
Dept: DERMATOLOGY | Facility: CLINIC | Age: 29
End: 2024-11-22
Payer: COMMERCIAL

## 2024-11-30 DIAGNOSIS — G43.009 MIGRAINE WITHOUT AURA AND RESPONSIVE TO TREATMENT: ICD-10-CM

## 2024-12-02 RX ORDER — RIZATRIPTAN BENZOATE 10 MG/1
TABLET ORAL
Qty: 18 TABLET | Refills: 0 | Status: SHIPPED | OUTPATIENT
Start: 2024-12-02

## 2024-12-11 ENCOUNTER — APPOINTMENT (OUTPATIENT)
Dept: NEUROLOGY | Facility: CLINIC | Age: 29
End: 2024-12-11
Payer: COMMERCIAL

## 2024-12-11 VITALS
WEIGHT: 159 LBS | SYSTOLIC BLOOD PRESSURE: 96 MMHG | BODY MASS INDEX: 24.18 KG/M2 | HEART RATE: 68 BPM | DIASTOLIC BLOOD PRESSURE: 64 MMHG | RESPIRATION RATE: 16 BRPM

## 2024-12-11 DIAGNOSIS — G43.711 INTRACTABLE CHRONIC MIGRAINE WITHOUT AURA AND WITH STATUS MIGRAINOSUS: ICD-10-CM

## 2024-12-11 RX ORDER — KETOROLAC TROMETHAMINE 10 MG/1
10 TABLET, FILM COATED ORAL EVERY 6 HOURS
Qty: 20 TABLET | Refills: 0 | Status: SHIPPED | OUTPATIENT
Start: 2024-12-11 | End: 2024-12-16

## 2024-12-11 RX ORDER — KETOROLAC TROMETHAMINE 30 MG/ML
60 INJECTION, SOLUTION INTRAMUSCULAR; INTRAVENOUS ONCE
Status: COMPLETED | OUTPATIENT
Start: 2024-12-11 | End: 2024-12-11

## 2024-12-11 NOTE — PROGRESS NOTES
Patient ID: Bertha Esposito is a 29 y.o. female.    Head/Face/Jaw Botulinum Injection    Date/Time: 12/11/2024 5:18 PM    Performed by: Niya Colunga MD  Authorized by: Niya Colunga MD      Consent:      Consent obtained:  Verbal     Consent given by:  Patient    Procedure details:      EMG used?  No     Electrical stimulation used?  No     Diluted by:  Preservative free saline     Medications: 200 Units onabotulinumtoxinA 100 unit      Total units available:  200       Ad hoc region injected:  Head see diagram with 200 units     Total units injected:  200     Total units wasted:  0    Post-procedure details:      Patient tolerance of procedure:  Tolerated well, no immediate complications    Comments: Please do not rub areas for 24 hours.  No pressure above eyebrows for 24 hours.  Watch out for helmets, headlamps, headbands, goggles, or massage for 24 hours.  If there is discomfort, ice for the first 24 hour,s heat after that.  Headaches may worsen, or you may experience neck stiffness.  If this occurs use your usual headache medication or a mild anti inflammatory such as advil or aleve.  Please call if you have difficulty swallowing.      tor

## 2024-12-11 NOTE — PATIENT INSTRUCTIONS
Please do not rub areas for 24 hours.  No pressure above eyebrows for 24 hours.  Watch out for helmets, headlamps, headbands, goggles, or massage for 24 hours.  If there is discomfort, ice for the first 24 hour,s heat after that.  Headaches may worsen, or you may experience neck stiffness.  If this occurs use your usual headache medication or a mild anti inflammatory such as advil or aleve.  Please call if you have difficulty swallowing.     You received Toradol injection in the office today to break a migraine headache cycle. Also called ketorolac . Tomorrow you are to start taking oral Toradol/Ketoralac 4 times daily with food for 5 days.Don't take any other NSAIDs (Aleve, ibuprofen, Motrin, naproxen) during 5 days of Toradol.  Avoid all other rescue medications if possible to break any overuse cycle.

## 2025-01-04 DIAGNOSIS — G43.009 MIGRAINE WITHOUT AURA AND RESPONSIVE TO TREATMENT: ICD-10-CM

## 2025-01-06 RX ORDER — RIZATRIPTAN BENZOATE 10 MG/1
TABLET ORAL
Qty: 18 TABLET | Refills: 3 | Status: SHIPPED | OUTPATIENT
Start: 2025-01-06

## 2025-02-02 DIAGNOSIS — F41.9 ANXIETY: ICD-10-CM

## 2025-02-02 RX ORDER — HYDROXYZINE HYDROCHLORIDE 10 MG/1
10 TABLET, FILM COATED ORAL EVERY 6 HOURS PRN
Qty: 30 TABLET | Refills: 0 | Status: SHIPPED | OUTPATIENT
Start: 2025-02-02

## 2025-02-03 DIAGNOSIS — E88.819 INSULIN RESISTANCE: Primary | ICD-10-CM

## 2025-02-03 RX ORDER — SEMAGLUTIDE 0.25 MG/.5ML
0.25 INJECTION, SOLUTION SUBCUTANEOUS
Qty: 2 ML | Refills: 1 | Status: SHIPPED | OUTPATIENT
Start: 2025-02-03

## 2025-02-04 ENCOUNTER — TELEPHONE (OUTPATIENT)
Dept: ENDOCRINOLOGY | Facility: CLINIC | Age: 30
End: 2025-02-04
Payer: COMMERCIAL

## 2025-02-11 NOTE — TELEPHONE ENCOUNTER
Denied for not having a Hx of stroke, MI, or symptomatic peripheral arterial disease. They will only consider if her BMI is classified as obese and one or more of the above named issues.    Appeal would not be beneficial

## 2025-02-12 DIAGNOSIS — E88.819 INSULIN RESISTANCE: ICD-10-CM

## 2025-02-13 RX ORDER — SEMAGLUTIDE 0.25 MG/.5ML
0.25 INJECTION, SOLUTION SUBCUTANEOUS
Qty: 2 ML | Refills: 2 | Status: SHIPPED | OUTPATIENT
Start: 2025-02-13 | End: 2025-02-13 | Stop reason: SDUPTHER

## 2025-02-13 RX ORDER — SEMAGLUTIDE 0.25 MG/.5ML
0.25 INJECTION, SOLUTION SUBCUTANEOUS
Qty: 2 ML | Refills: 2 | Status: SHIPPED | OUTPATIENT
Start: 2025-02-13 | End: 2025-02-13

## 2025-02-13 RX ORDER — SEMAGLUTIDE 0.25 MG/.5ML
0.25 INJECTION, SOLUTION SUBCUTANEOUS
Qty: 2 ML | Refills: 2 | Status: SHIPPED | OUTPATIENT
Start: 2025-02-13 | End: 2025-02-17

## 2025-02-17 DIAGNOSIS — E16.1 REACTIVE HYPOGLYCEMIA: Primary | ICD-10-CM

## 2025-02-18 ENCOUNTER — APPOINTMENT (OUTPATIENT)
Dept: DERMATOLOGY | Facility: CLINIC | Age: 30
End: 2025-02-18
Payer: COMMERCIAL

## 2025-02-19 ENCOUNTER — APPOINTMENT (OUTPATIENT)
Dept: NEUROLOGY | Facility: CLINIC | Age: 30
End: 2025-02-19
Payer: COMMERCIAL

## 2025-02-19 VITALS
WEIGHT: 159 LBS | DIASTOLIC BLOOD PRESSURE: 70 MMHG | BODY MASS INDEX: 24.18 KG/M2 | SYSTOLIC BLOOD PRESSURE: 100 MMHG | HEART RATE: 68 BPM | RESPIRATION RATE: 20 BRPM

## 2025-02-19 DIAGNOSIS — G43.711 INTRACTABLE CHRONIC MIGRAINE WITHOUT AURA AND WITH STATUS MIGRAINOSUS: ICD-10-CM

## 2025-02-19 PROCEDURE — 64615 CHEMODENERV MUSC MIGRAINE: CPT | Performed by: PSYCHIATRY & NEUROLOGY

## 2025-02-19 PROCEDURE — 96372 THER/PROPH/DIAG INJ SC/IM: CPT | Performed by: PSYCHIATRY & NEUROLOGY

## 2025-02-19 RX ORDER — KETOROLAC TROMETHAMINE 30 MG/ML
60 INJECTION, SOLUTION INTRAMUSCULAR; INTRAVENOUS ONCE
Status: COMPLETED | OUTPATIENT
Start: 2025-02-19 | End: 2025-02-19

## 2025-02-19 RX ORDER — KETOROLAC TROMETHAMINE 10 MG/1
10 TABLET, FILM COATED ORAL EVERY 6 HOURS
Qty: 20 TABLET | Refills: 0 | Status: SHIPPED | OUTPATIENT
Start: 2025-02-19 | End: 2025-02-24

## 2025-02-19 RX ADMIN — KETOROLAC TROMETHAMINE 60 MG: 30 INJECTION, SOLUTION INTRAMUSCULAR; INTRAVENOUS at 15:00

## 2025-02-19 NOTE — PROGRESS NOTES
Patient ID: Bertha Esposito is a 29 y.o. female.    Head/Face/Jaw Botulinum Injection    Date/Time: 2/19/2025 4:26 PM    Performed by: Niya Colunga MD  Authorized by: Niya Colunga MD      Consent:      Consent obtained:  Verbal     Consent given by:  Patient    Procedure details:      EMG used?  No     Electrical stimulation used?  No     Diluted by:  Preservative free saline     Medications: 200 Units onabotulinumtoxinA 100 unit      Total units available:  200       Ad hoc region injected:  Head see diagram with 200 units     Total units injected:  200     Total units wasted:  0    Post-procedure details:      Patient tolerance of procedure:  Tolerated well, no immediate complications    Comments: Please do not rub areas for 24 hours.  No pressure above eyebrows for 24 hours.  Watch out for helmets, headlamps, headbands, goggles, or massage for 24 hours.  If there is discomfort, ice for the first 24 hour,s heat after that.  Headaches may worsen, or you may experience neck stiffness.  If this occurs use your usual headache medication or a mild anti inflammatory such as advil or aleve.  Please call if you have difficulty swallowing.    You received Toradol today for your severe headache.  We are going to continue with 5 day of pills starting tomorrow to break your headache cycle.  Take 1 pill with breakfast lunch dinner and bedtime for 5 days.  Take with food.  Try not to take your triptan or antiinflammatory during this time.  If you have any nausea or diarrhea with the medicine stop and call on the next business day.

## 2025-02-24 ENCOUNTER — TELEPHONE (OUTPATIENT)
Dept: ENDOCRINOLOGY | Facility: CLINIC | Age: 30
End: 2025-02-24
Payer: COMMERCIAL

## 2025-02-24 DIAGNOSIS — E16.1 REACTIVE HYPOGLYCEMIA: ICD-10-CM

## 2025-02-24 DIAGNOSIS — G43.711 CHRONIC MIGRAINE WITHOUT AURA, INTRACTABLE, WITH STATUS MIGRAINOSUS: ICD-10-CM

## 2025-02-24 RX ORDER — RIMEGEPANT SULFATE 75 MG/75MG
TABLET, ORALLY DISINTEGRATING ORAL
Qty: 16 TABLET | Refills: 5 | Status: SHIPPED | OUTPATIENT
Start: 2025-02-24

## 2025-02-24 NOTE — TELEPHONE ENCOUNTER
Prior Auth for rybelsus pending determination  Submitted on 2/24 via [facility]    KEY: [id number]

## 2025-03-12 NOTE — PROGRESS NOTES
follow up for low blood sugar readings. LV with me 01/2024.     History of Present Illness  29 y.o. female  with Hashimoto's disease (not requiring LT4 replacement) here for follow-up of low blood sugar readings.    When sugars are low, she gets migraines, dizziness, nausea, foggy thinking, shakiness, anxiety. Using Rx bars since they are a mix of carbs/proteins/fats. Symptoms will resolve with eating, but if already has symptoms has to eat something with a lot of carbs (usually uses plain oatmeal or rice- plain carb)). May also get chipotle. Has to drink a lot of water. Drinking 8-10 24oz water bottles a day. Urine is yellow to clear.   Symptoms mostly occur in morning if she doesn't eat right away. Has to have a snack between breakfast and lunch. Has to eat every 1.5-2 hours. 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     Breakfast: overnight oats with PB2 and protein. Lunch: pumpkin seeds, Rx bar, and garbanzo bean pasta with red sauce. For dinner, had salad, eggs, and toast. Doesn't eat a lot of meat other than chicken and fish. Dairy allergy and tries to eat gluten free as well (no Celiac, but does have sensitivity). Does pilates and cardio. Stopped running because it worsened her symptoms.     Grandfather has diabetes. Grandmother, mother, and sister have blood sugar issues and have to eat often.   Has gained weight since not running. Always cold. Palpitations resolved now that she is on anxiety medications. No dry skin or hair loss. Used to be very regular with bowel movements, now goes 3-4x/week. Just feels like she can't go.     Denies fevers, chills, syncope, chest pain, shortness of breath, cough, abdominal pain, vomiting, diarrhea, numbness/tingling in arms or legs.   Always very thirsty (for the past several years)     Needs to eat every 1.5 to 2 hours  Symptoms: anxiety, coburn, headaches,dizziness, shakiness  Always thirsty  Started: last few years  Time of day: during the day  Timing  "in relation to food: 1.5 - 2 hours after eating     Monitoring BG with glucose meter.  Fasting high 60s-90s  During the day when eating regularly: 90s-100s  During symptoms: mid 50s to 70s     Labs from 5/19/2023 (fasting)  Serum glucose: 73  insulin: 6  c-peptide: 0.9  BHB: 0.15 (N: 0.02-0.27)     TSH 2.80     Has tried acarbose and Januvia, she did not tolerate either.  Tolerated low dose metformin which helped.  Wanted to try GLP-1 RA but insurance does not cover this.  Prescribed Rybelsus to a [place] pharmacy per her request.    Today:  -doing well, feels that Rybelsus is helping so far    ROS  General: no fever or chills  CV: no chest pain   Respiratory: no shortness of breath  MSK: no lower extremity edema  Neuro: no headache or dizziness  See HPI for Endocrine ROS    Past Medical History:   Diagnosis Date    WBC decreased 06/13/2023       Past Surgical History:   Procedure Laterality Date    OTHER SURGICAL HISTORY  02/04/2021    No history of surgery       Social History     Socioeconomic History    Marital status:      Spouse name: Not on file    Number of children: Not on file    Years of education: Not on file    Highest education level: Not on file   Occupational History    Not on file   Tobacco Use    Smoking status: Never    Smokeless tobacco: Never   Vaping Use    Vaping status: Never Used   Substance and Sexual Activity    Alcohol use: Yes    Drug use: Never    Sexual activity: Defer   Other Topics Concern    Not on file   Social History Narrative    Not on file     Social Drivers of Health     Financial Resource Strain: Not on file   Food Insecurity: Not on file   Transportation Needs: Not on file   Physical Activity: Not on file   Stress: Not on file   Social Connections: Not on file   Intimate Partner Violence: Not on file   Housing Stability: Not on file       Physical Exam   height is 1.727 m (5' 8\") and weight is 67.6 kg (149 lb). Her temporal temperature is 35.2 °C (95.4 °F). Her blood " "pressure is 98/64 and her pulse is 74.   General: not in acute distress  HEENT: AKBAR, EOMI  Thyroid: no goiter  Neuro: alert and oriented x 3    Current Outpatient Medications   Medication Sig Dispense Refill    FLUoxetine (PROzac) 40 mg capsule Take 1 capsule (40 mg) by mouth once daily. 30 capsule 1    hydrOXYzine HCL (Atarax) 10 mg tablet TAKE ONE TABLET BY MOUTH EVERY 6 HOURS AS NEEDED FOR ANXIETY 30 tablet 0    loratadine (Claritin) 10 mg tablet Take 1 tablet (10 mg) by mouth once daily as needed.      metFORMIN  mg 24 hr tablet Take 2 tabs in the morning and 1 tab in the evening (with breakfast and dinner). Do not crush, chew, or split. (Patient taking differently: Take 2 tabs in the morning and 1 tab in the evening (with breakfast and dinner). Do not crush, chew, or split.  Trying rybelsus) 90 tablet 5    Nurtec ODT 75 mg tablet,disintegrating DISSOLVE ONE TABLET (75 mg) IN MOUTH once DAILY AS NEEDED (migraine) 16 tablet 5    onabotulinumtoxinA (Botox) 100 unit injection 200 units for migraine      ondansetron (Zofran) 4 mg tablet Take 1 tablet (4 mg) by mouth every 8 hours if needed for nausea or vomiting. 30 tablet 3    pen needle, diabetic 32 gauge x 5/32\" needle Use one daily with Victoza injections 50 each 5    rizatriptan (Maxalt) 10 mg tablet TAKE 1 TABLET BY MOUTH AT ONSET OF HEADACHE, MAY REPEAT EVERY TWO HOURS AS NEEDED. MAXIMUM OF 3 TABLETS IN 24 HOURS 18 tablet 3    semaglutide (Rybelsus) 3 mg tablet Take 1 tab, once a day in the morning, on an empty stomach with water. Eat 30 minutes after taking this medication. 30 tablet 0     No current facility-administered medications for this visit.     <Mixed meal test>   Latest Reference Range & Units 01/17/24 09:30 01/17/24 10:10 01/17/24 10:25 01/17/24 10:54 01/17/24 11:29 01/17/24 12:30   GLUCOSE 74 - 99 mg/dL 79 64 (L) 64 (L) 73 (L) 79 93   C-Peptide 0.7 - 3.9 ng/mL 1.3 2.7 3.0 3.0 2.8 1.9   Insulin 3 - 25 uIU/mL  34 (H) 29 (H) 28 (H)   "   Cortisol  A.M. 5.0 - 20.0 ug/dL 8.1        Proinsulin, Intact <=7.2 pmol/L <2.0 <2.0 <2.0 <2.0 4.1 <2.0     Assessment and Plan  29 y.o. female  with Hashimoto's disease (not requiring LT4 replacement) here for follow-up of low blood glucose readings.     1. Low blood sugar readings  2. Hashimoto's thyroiditis (not requiring LT4 replacement)  For the past few years, she has had to eat every 1.5 to 2 hours to prevent the symptoms below:  Dizziness, anxiety, moodiness, migraines, shakiness.  Symptoms resolve within 5-10 minutes after eating.  Symptoms occur mainly during the day between meals.   Fasting hypoglycemia is not frequent, but she has a migraine at night, she will eat bread. The morning following, BG has been in the mid 50s or 60s before. I advised her to avoid eating carbs only as this will cause a low blood sugar later.  When she is able to eat regularly, BGs are in the 90s.  She also reports being thirsty all the time. This has also been occurring for the past several years. Has tingling in her fingertips.     Eating small frequent meals. Has carbs and protein together.  For breakfast, she has a protein shake and big bowl of overnight oats.    Mixed meal test demonstrates a reactive hypoglycemia-like pattern (lowest BG was 64), but she does not have true hypoglycemia. BG improved towards the end of the mixed meal test without intervention. Insulin level is high after eating which I think is a sign of insulin resistance causing a mismatch of BG level and insulin production.    There are some reports of medications like acarbose, DPP4 inhibitor, metformin, and GLP-1 RA helping to improve reactive hypoglycemia.  So far, she has tried: acarbose (abdominal pain), Januvia (bloated), metformin XR (1000-500 mg BID)  Metformin was helpful with improving fluctuations in her blood sugars and symptoms.  However, she wanted to try Ozempic, but this was too expensive.   She is currently on Rybelsus (purchasing out of  pocket through [facility]).     She has been taking Rybelsus 3 mg since about 2/21/2025 (almost 4 weeks).  No longer taking metformin.  She feels that Rybelsus is helping her with having less fluctuations in her blood sugars and symptoms.   Going forward, we discussed increasing Rybelsus to the 7 mg dose or adding metformin to the Rybelsus 3 mg if needed.    She is traveling next week, so will continue with Rybelsus 3 mg QDAY only for now but will consider the options above when she returns.    I have given her a referral for POTS evaluation before as it can cause reactive hypoglycemia along with symptoms of thirst and tinging in fingers. She has not made an appointment yet, but if symptoms are well managed, it is not critical at this time.    PLAN:  -continue Rybelsus 3 mg QDAY  -continue low GI diet; continue small frequent meals, always pair low GI carbs with protein (given printout with examples today)  -TSH ordered by PCP (monitor yearly)    Follow-up in 6 months with Dr. Parra ([place])

## 2025-03-13 ENCOUNTER — APPOINTMENT (OUTPATIENT)
Dept: NEUROLOGY | Facility: CLINIC | Age: 30
End: 2025-03-13
Payer: COMMERCIAL

## 2025-03-13 DIAGNOSIS — G43.009 MIGRAINE WITHOUT AURA AND RESPONSIVE TO TREATMENT: Primary | ICD-10-CM

## 2025-03-13 PROCEDURE — 99213 OFFICE O/P EST LOW 20 MIN: CPT | Performed by: PSYCHIATRY & NEUROLOGY

## 2025-03-13 ASSESSMENT — PATIENT HEALTH QUESTIONNAIRE - PHQ9
2. FEELING DOWN, DEPRESSED OR HOPELESS: NOT AT ALL
SUM OF ALL RESPONSES TO PHQ9 QUESTIONS 1 AND 2: 0
1. LITTLE INTEREST OR PLEASURE IN DOING THINGS: NOT AT ALL

## 2025-03-13 NOTE — PROGRESS NOTES
Botox every 60 days. Last 2-    Doing so much better with migraine experience since receiving Botox every 60 days.   10 migraine days per month but much less severe.   1-2 days per month of disability  Used to be 12 days of disability and 15-20 migraine days and much more severe.     Treating migraine with rizatriptan and Nurtec together. Will start working in 30 min and relieve completely in 2 hours. About 2-3 days a week    Migraine occurs above left eye and temple and left occipital neck area. Describes as throbbing and like an elastic band around her head.   Associated light and noise sensitivity  and nausea. Zofran is helpful for this.   Triggers are low blood sugar. Stress, menstrual cycle, weather.    Sleep is good. Averages 7 hours. Occasional  trouble staying asleep.     Fluoxetine for anxiety and very helpful.   Denies depression.     Switching from metformin to rybelsus for hypoglycemia control.   Has been on for 2 weeks. Feels like it is doing well. Is having stomach pain especially when she eats.       To review what we discussed today   Assessment/Plan   Problem List Items Addressed This Visit       Migraine without aura and responsive to treatment - Primary     I am glad you are feeling better.             Your next appointment with me is 5/1/2025.    Thank you for visiting the office of Dr Niya Colunga. It was a pleasure working with you today.   [address] Mon-Thursday  [facility] 5th [facility] Fridays  Our office phone number is [phone]. You can use this number to leave messages for Erin or to schedule or reschedule an appointment or request refills.  If you were seen on Thursday afternoon or Friday our office will call on Monday or Tuesday to reschedule your appointment. If you do not receive a call please call us.   We are also available for messages on my chart. We make every effort to respond to your concerns by the end of the next business day.

## 2025-03-13 NOTE — PATIENT INSTRUCTIONS
To review what we discussed today   [unfilled]  Problem List Items Addressed This Visit       Migraine without aura and responsive to treatment - Primary     I am glad you are feeling better.             Your next appointment with me is 5/1/2025.    Thank you for visiting the office of Dr Niya Colunga. It was a pleasure working with you today.   [address] Mon-Thursday  [facility] 5th [facility] Fridays  Our office phone number is [phone]. You can use this number to leave messages for Erin or to schedule or reschedule an appointment or request refills.  If you were seen on Thursday afternoon or Friday our office will call on Monday or Tuesday to reschedule your appointment. If you do not receive a call please call us.   We are also available for messages on my chart. We make every effort to respond to your concerns by the end of the next business day.

## 2025-03-17 ENCOUNTER — APPOINTMENT (OUTPATIENT)
Facility: CLINIC | Age: 30
End: 2025-03-17
Payer: COMMERCIAL

## 2025-03-18 ENCOUNTER — APPOINTMENT (OUTPATIENT)
Dept: ENDOCRINOLOGY | Facility: CLINIC | Age: 30
End: 2025-03-18
Payer: COMMERCIAL

## 2025-03-18 VITALS
WEIGHT: 149 LBS | BODY MASS INDEX: 22.58 KG/M2 | HEIGHT: 68 IN | HEART RATE: 74 BPM | TEMPERATURE: 95.4 F | SYSTOLIC BLOOD PRESSURE: 98 MMHG | DIASTOLIC BLOOD PRESSURE: 64 MMHG

## 2025-03-18 DIAGNOSIS — E88.819 INSULIN RESISTANCE: Primary | ICD-10-CM

## 2025-03-18 DIAGNOSIS — E16.2 LOW BLOOD SUGAR READING: ICD-10-CM

## 2025-03-18 PROCEDURE — 3008F BODY MASS INDEX DOCD: CPT | Performed by: STUDENT IN AN ORGANIZED HEALTH CARE EDUCATION/TRAINING PROGRAM

## 2025-03-18 PROCEDURE — 99215 OFFICE O/P EST HI 40 MIN: CPT | Performed by: STUDENT IN AN ORGANIZED HEALTH CARE EDUCATION/TRAINING PROGRAM

## 2025-03-18 ASSESSMENT — ENCOUNTER SYMPTOMS
DEPRESSION: 0
LOSS OF SENSATION IN FEET: 0
OCCASIONAL FEELINGS OF UNSTEADINESS: 0

## 2025-03-24 DIAGNOSIS — F41.9 ANXIETY: ICD-10-CM

## 2025-03-24 RX ORDER — FLUOXETINE HYDROCHLORIDE 40 MG/1
40 CAPSULE ORAL DAILY
Qty: 90 CAPSULE | Refills: 1 | Status: SHIPPED | OUTPATIENT
Start: 2025-03-24

## 2025-04-21 ENCOUNTER — APPOINTMENT (OUTPATIENT)
Facility: CLINIC | Age: 30
End: 2025-04-21
Payer: COMMERCIAL

## 2025-04-21 ENCOUNTER — TELEPHONE (OUTPATIENT)
Dept: ENDOCRINOLOGY | Facility: CLINIC | Age: 30
End: 2025-04-21

## 2025-04-21 DIAGNOSIS — E88.819 INSULIN RESISTANCE: Primary | ICD-10-CM

## 2025-04-21 DIAGNOSIS — F41.9 ANXIETY: ICD-10-CM

## 2025-04-21 DIAGNOSIS — G43.009 MIGRAINE WITHOUT AURA AND RESPONSIVE TO TREATMENT: ICD-10-CM

## 2025-04-21 RX ORDER — HYDROXYZINE HYDROCHLORIDE 10 MG/1
TABLET, FILM COATED ORAL
Qty: 30 TABLET | Refills: 0 | Status: SHIPPED | OUTPATIENT
Start: 2025-04-21

## 2025-04-21 NOTE — TELEPHONE ENCOUNTER
Prior Auth for rybelsus pending determination  Submitted on 4/21 via [facility] - [employer]. Patient paying out of pocket

## 2025-04-23 RX ORDER — RIZATRIPTAN BENZOATE 10 MG/1
TABLET ORAL
Qty: 18 TABLET | Refills: 3 | Status: SHIPPED | OUTPATIENT
Start: 2025-04-23

## 2025-05-01 ENCOUNTER — APPOINTMENT (OUTPATIENT)
Dept: NEUROLOGY | Facility: CLINIC | Age: 30
End: 2025-05-01
Payer: COMMERCIAL

## 2025-05-01 VITALS
DIASTOLIC BLOOD PRESSURE: 60 MMHG | SYSTOLIC BLOOD PRESSURE: 96 MMHG | BODY MASS INDEX: 22.66 KG/M2 | HEART RATE: 60 BPM | WEIGHT: 149 LBS | RESPIRATION RATE: 20 BRPM

## 2025-05-01 DIAGNOSIS — G43.711 INTRACTABLE CHRONIC MIGRAINE WITHOUT AURA AND WITH STATUS MIGRAINOSUS: ICD-10-CM

## 2025-05-01 DIAGNOSIS — G43.831: Primary | ICD-10-CM

## 2025-05-01 PROCEDURE — 99214 OFFICE O/P EST MOD 30 MIN: CPT | Performed by: PSYCHIATRY & NEUROLOGY

## 2025-05-01 PROCEDURE — 96372 THER/PROPH/DIAG INJ SC/IM: CPT | Performed by: PSYCHIATRY & NEUROLOGY

## 2025-05-01 PROCEDURE — 64615 CHEMODENERV MUSC MIGRAINE: CPT | Performed by: PSYCHIATRY & NEUROLOGY

## 2025-05-01 RX ORDER — KETOROLAC TROMETHAMINE 30 MG/ML
60 INJECTION, SOLUTION INTRAMUSCULAR; INTRAVENOUS ONCE
Status: COMPLETED | OUTPATIENT
Start: 2025-05-01 | End: 2025-05-01

## 2025-05-01 RX ORDER — KETOROLAC TROMETHAMINE 10 MG/1
10 TABLET, FILM COATED ORAL EVERY 6 HOURS
Qty: 20 TABLET | Refills: 0 | Status: SHIPPED | OUTPATIENT
Start: 2025-05-01 | End: 2025-05-06

## 2025-05-01 RX ORDER — DROSPIRENONE AND ETHINYL ESTRADIOL 0.02-3(28)
1 KIT ORAL DAILY
Qty: 28 TABLET | Refills: 12 | Status: SHIPPED | OUTPATIENT
Start: 2025-05-01 | End: 2026-05-01

## 2025-05-01 RX ADMIN — KETOROLAC TROMETHAMINE 60 MG: 30 INJECTION, SOLUTION INTRAMUSCULAR; INTRAVENOUS at 09:15

## 2025-05-01 ASSESSMENT — PAIN SCALES - GENERAL: PAINLEVEL_OUTOF10: 0-NO PAIN

## 2025-05-01 NOTE — PROGRESS NOTES
Patient with severe CUATE with cramps and nausea prior to headache prior to menstruation, Had been on Missy in the past and is considering this versus IUD. Takes Nurtec for treatment but not for prevention. Gets regular pap exams and has appointment.     To review what we discussed today   Assessment/Plan   Problem List Items Addressed This Visit       Menstrual migraine, intractable, with status migrainosus - Primary    Relevant Medications    drospirenone-ethinyl estradiol (Missy, 28,) 3-0.02 mg tablet    Intractable chronic migraine without aura and with status migrainosus    Relevant Medications    ketorolac (Toradol) 10 mg tablet    ketorolac (Toradol) injection 60 mg (Start on 5/1/2025  9:30 AM)       Your next appointment with me is Visit date not found.    Thank you for visiting the office of Dr Niya Colunga. It was a pleasure working with you today.   [address] Mon-Thursday  [facility] 5th floor [facility] Fridays  Our office phone number is [phone]. You can use this number to leave messages for Erin or to schedule or reschedule an appointment or request refills.  If you were seen on Thursday afternoon or Friday our office will call on Monday or Tuesday to reschedule your appointment. If you do not receive a call please call us.   We are also available for messages on my chart. We make every effort to respond to your concerns by the end of the next business day.

## 2025-05-01 NOTE — ASSESSMENT & PLAN NOTE
We discussed treating the menstrual migraine today. We chose Missy because of the help it provided in the past with your cramps. If you cjoose in the future an IUD let me know and we can try a Nurtec or Qulipta protocol for the days leading up to and 1 day after your period starts.

## 2025-05-01 NOTE — PROGRESS NOTES
Patient ID: Bertha Esposito is a 29 y.o. female.    Head/Face/Jaw Botulinum Injection    Date/Time: 5/1/2025 4:06 PM    Performed by: Niya Colunga MD  Authorized by: Niya Colunga MD      Consent:      Consent obtained:  Verbal     Consent given by:  Patient    Procedure details:      EMG used?  No     Electrical stimulation used?  No     Diluted by:  Preservative free saline     Medications: 200 Units onabotulinumtoxinA 100 unit      Total units available:  200       Ad hoc region injected:  Head see diagram with 200 units     Total units injected:  200     Total units wasted:  0    Post-procedure details:      Patient tolerance of procedure:  Tolerated well, no immediate complications    Comments: Please do not rub areas for 24 hours.  No pressure above eyebrows for 24 hours.  Watch out for helmets, headlamps, headbands, goggles, or massage for 24 hours.  If there is discomfort, ice for the first 24 hour,s heat after that.  Headaches may worsen, or you may experience neck stiffness.  If this occurs use your usual headache medication or a mild anti inflammatory such as advil or aleve.  Please call if you have difficulty swallowing.

## 2025-06-02 ENCOUNTER — APPOINTMENT (OUTPATIENT)
Facility: CLINIC | Age: 30
End: 2025-06-02
Payer: COMMERCIAL

## 2025-06-02 VITALS — WEIGHT: 146 LBS | DIASTOLIC BLOOD PRESSURE: 69 MMHG | BODY MASS INDEX: 22.2 KG/M2 | SYSTOLIC BLOOD PRESSURE: 94 MMHG

## 2025-06-02 DIAGNOSIS — Z30.09 GENERAL COUNSELLING AND ADVICE ON CONTRACEPTION: Primary | ICD-10-CM

## 2025-06-02 DIAGNOSIS — Z30.011 ENCOUNTER FOR INITIAL PRESCRIPTION OF CONTRACEPTIVE PILLS: ICD-10-CM

## 2025-06-02 DIAGNOSIS — G43.109 MIGRAINE WITH AURA AND WITHOUT STATUS MIGRAINOSUS, NOT INTRACTABLE: ICD-10-CM

## 2025-06-02 PROCEDURE — 99203 OFFICE O/P NEW LOW 30 MIN: CPT

## 2025-06-02 PROCEDURE — 1036F TOBACCO NON-USER: CPT

## 2025-06-02 RX ORDER — DROSPIRENONE 4 MG/1
4 TABLET, FILM COATED ORAL DAILY
Qty: 84 TABLET | Refills: 3 | Status: SHIPPED | OUTPATIENT
Start: 2025-06-02

## 2025-06-02 NOTE — PROGRESS NOTES
Assessment/Plan     Contraceptive Counseling  -Counseled on risk associated with c-OCP and increased risk of ischemic stroke for patient with migraine with aura.   -Pt counseled on available methods of progesterone only contraception including POPs, Depo, Nexplanon, and IUD. Risks and benefits discussed of each, all questions answered. Pt elects IUD, and would POP as bridge to insertion. Advised to schedule follow up for IUD placement.   - Rx Slynd sent, instructions provided  -Discussed expectations for IUD insertion procedure. Advised her to take 800mg ibuprofen 30min before scheduled appt time, and to schedule appt for when she is expecting to be menstruating, if possible. Reviewed risks specific to IUD insertion including PID and uterine perforation. Reviewed common side effects including irregular menstrual bleeding, especially for first 6 months after placement, usually followed by amenorrhea. Pt verbalizes understanding.      Michlele MUSA    I was present with the APRN student who participated in the documentation of this note. I have personally seen and re-examined the patient and performed the medical decision-making components (assessment and plan of care). I have reviewed the APRN student documentation and verified the findings in the note as written with additions or exceptions as stated in the body of this note.    Gregoria Bailey, ZEUS-CNM     Subjective     Bertha AGUIRRE Vaibhav is a 29 y.o. female presenting for contraceptive counseling. Patient states that she was previously on OCP that helped with her bleeding and migraines. Patient states she gets migraines, sometimes with aura, approx. 5-6 times a month with increased frequency around her period. Patient states her periods are regular, 31 day cycle with 4 days of bleeding. Patient is currently  and sexually active but does not want children in the near future.     Objective     BP 94/69   Wt 66.2 kg (146 lb)   LMP 05/18/2025  (Approximate)   BMI 22.20 kg/m²     Physical Exam  Vitals reviewed.   Constitutional:       General: She is not in acute distress.     Appearance: Normal appearance.   HENT:      Head: Normocephalic and atraumatic.   Pulmonary:      Effort: Pulmonary effort is normal.   Musculoskeletal:         General: Normal range of motion.      Cervical back: Normal range of motion.   Neurological:      Mental Status: She is alert and oriented to person, place, and time.   Psychiatric:         Mood and Affect: Mood normal.         Behavior: Behavior normal.         Thought Content: Thought content normal.         Judgment: Judgment normal.

## 2025-06-06 ENCOUNTER — APPOINTMENT (OUTPATIENT)
Facility: CLINIC | Age: 30
End: 2025-06-06
Payer: COMMERCIAL

## 2025-06-11 ENCOUNTER — PATIENT MESSAGE (OUTPATIENT)
Dept: ENDOCRINOLOGY | Facility: CLINIC | Age: 30
End: 2025-06-11
Payer: COMMERCIAL

## 2025-06-11 DIAGNOSIS — E88.819 INSULIN RESISTANCE: ICD-10-CM

## 2025-06-12 ENCOUNTER — TELEPHONE (OUTPATIENT)
Dept: ENDOCRINOLOGY | Facility: CLINIC | Age: 30
End: 2025-06-12
Payer: COMMERCIAL

## 2025-06-12 NOTE — TELEPHONE ENCOUNTER
Prior Auth for rybelsus pending determination  Submitted on 6/12 via [facility]    KEY: [id number]

## 2025-06-13 ENCOUNTER — APPOINTMENT (OUTPATIENT)
Facility: CLINIC | Age: 30
End: 2025-06-13
Payer: COMMERCIAL

## 2025-07-10 ENCOUNTER — APPOINTMENT (OUTPATIENT)
Facility: CLINIC | Age: 30
End: 2025-07-10
Payer: COMMERCIAL

## 2025-07-17 ENCOUNTER — APPOINTMENT (OUTPATIENT)
Dept: DERMATOLOGY | Facility: CLINIC | Age: 30
End: 2025-07-17
Payer: COMMERCIAL

## 2025-07-29 ENCOUNTER — APPOINTMENT (OUTPATIENT)
Dept: NEUROLOGY | Facility: CLINIC | Age: 30
End: 2025-07-29
Payer: COMMERCIAL

## 2025-07-29 VITALS
HEART RATE: 76 BPM | SYSTOLIC BLOOD PRESSURE: 90 MMHG | DIASTOLIC BLOOD PRESSURE: 70 MMHG | WEIGHT: 145 LBS | BODY MASS INDEX: 22.05 KG/M2 | RESPIRATION RATE: 20 BRPM

## 2025-07-29 DIAGNOSIS — G43.711 CHRONIC MIGRAINE WITHOUT AURA, INTRACTABLE, WITH STATUS MIGRAINOSUS: ICD-10-CM

## 2025-07-29 DIAGNOSIS — G43.009 MIGRAINE WITHOUT AURA AND RESPONSIVE TO TREATMENT: ICD-10-CM

## 2025-07-29 DIAGNOSIS — G43.711 INTRACTABLE CHRONIC MIGRAINE WITHOUT AURA AND WITH STATUS MIGRAINOSUS: ICD-10-CM

## 2025-07-29 PROCEDURE — 64615 CHEMODENERV MUSC MIGRAINE: CPT | Performed by: PSYCHIATRY & NEUROLOGY

## 2025-07-29 PROCEDURE — 96372 THER/PROPH/DIAG INJ SC/IM: CPT | Performed by: PSYCHIATRY & NEUROLOGY

## 2025-07-29 RX ORDER — KETOROLAC TROMETHAMINE 30 MG/ML
60 INJECTION, SOLUTION INTRAMUSCULAR; INTRAVENOUS ONCE
Status: COMPLETED | OUTPATIENT
Start: 2025-07-29 | End: 2025-07-29

## 2025-07-29 RX ORDER — KETOROLAC TROMETHAMINE 10 MG/1
10 TABLET, FILM COATED ORAL EVERY 6 HOURS
Qty: 20 TABLET | Refills: 0 | Status: SHIPPED | OUTPATIENT
Start: 2025-07-29 | End: 2025-08-03

## 2025-07-29 RX ORDER — RIZATRIPTAN BENZOATE 10 MG/1
TABLET ORAL
Qty: 18 TABLET | Refills: 5 | Status: SHIPPED | OUTPATIENT
Start: 2025-07-29

## 2025-07-29 RX ADMIN — KETOROLAC TROMETHAMINE 60 MG: 30 INJECTION, SOLUTION INTRAMUSCULAR; INTRAVENOUS at 11:20

## 2025-08-04 DIAGNOSIS — R53.83 FATIGUE, UNSPECIFIED TYPE: ICD-10-CM

## 2025-08-04 DIAGNOSIS — Z13.220 LIPID SCREENING: Primary | ICD-10-CM

## 2025-08-04 DIAGNOSIS — E06.3 HASHIMOTO'S DISEASE: ICD-10-CM

## 2025-08-04 DIAGNOSIS — F41.9 ANXIETY: ICD-10-CM

## 2025-08-04 DIAGNOSIS — Z00.00 HEALTHCARE MAINTENANCE: ICD-10-CM

## 2025-08-05 ENCOUNTER — APPOINTMENT (OUTPATIENT)
Dept: NEUROLOGY | Facility: CLINIC | Age: 30
End: 2025-08-05
Payer: COMMERCIAL

## 2025-08-07 ENCOUNTER — APPOINTMENT (OUTPATIENT)
Facility: CLINIC | Age: 30
End: 2025-08-07
Payer: COMMERCIAL

## 2025-08-12 LAB
25(OH)D3+25(OH)D2 SERPL-MCNC: 38 NG/ML (ref 30–100)
ALBUMIN SERPL-MCNC: 4.6 G/DL (ref 3.6–5.1)
ALP SERPL-CCNC: 51 U/L (ref 31–125)
ALT SERPL-CCNC: 11 U/L (ref 6–29)
ANION GAP SERPL CALCULATED.4IONS-SCNC: 7 MMOL/L (CALC) (ref 7–17)
AST SERPL-CCNC: 11 U/L (ref 10–30)
BASOPHILS # BLD AUTO: 0 CELLS/UL (ref 0–200)
BASOPHILS NFR BLD AUTO: 0 %
BILIRUB SERPL-MCNC: 0.7 MG/DL (ref 0.2–1.2)
BUN SERPL-MCNC: 10 MG/DL (ref 7–25)
CALCIUM SERPL-MCNC: 9.7 MG/DL (ref 8.6–10.2)
CHLORIDE SERPL-SCNC: 105 MMOL/L (ref 98–110)
CHOLEST SERPL-MCNC: 183 MG/DL
CHOLEST/HDLC SERPL: 3.3 (CALC)
CO2 SERPL-SCNC: 25 MMOL/L (ref 20–32)
CREAT SERPL-MCNC: 0.66 MG/DL (ref 0.5–0.97)
EGFRCR SERPLBLD CKD-EPI 2021: 120 ML/MIN/1.73M2
EOSINOPHIL # BLD AUTO: 227 CELLS/UL (ref 15–500)
EOSINOPHIL NFR BLD AUTO: 5.4 %
ERYTHROCYTE [DISTWIDTH] IN BLOOD BY AUTOMATED COUNT: 13.4 % (ref 11–15)
GLUCOSE SERPL-MCNC: 87 MG/DL (ref 65–99)
HCT VFR BLD AUTO: 37.9 % (ref 35–45)
HDLC SERPL-MCNC: 55 MG/DL
HGB BLD-MCNC: 12.4 G/DL (ref 11.7–15.5)
LDLC SERPL CALC-MCNC: 113 MG/DL (CALC)
LYMPHOCYTES # BLD AUTO: 1399 CELLS/UL (ref 850–3900)
LYMPHOCYTES NFR BLD AUTO: 33.3 %
MCH RBC QN AUTO: 31.4 PG (ref 27–33)
MCHC RBC AUTO-ENTMCNC: 32.7 G/DL (ref 32–36)
MCV RBC AUTO: 95.9 FL (ref 80–100)
MONOCYTES # BLD AUTO: 487 CELLS/UL (ref 200–950)
MONOCYTES NFR BLD AUTO: 11.6 %
NEUTROPHILS # BLD AUTO: 2087 CELLS/UL (ref 1500–7800)
NEUTROPHILS NFR BLD AUTO: 49.7 %
NONHDLC SERPL-MCNC: 128 MG/DL (CALC)
PLATELET # BLD AUTO: 252 THOUSAND/UL (ref 140–400)
PMV BLD REES-ECKER: 11 FL (ref 7.5–12.5)
POTASSIUM SERPL-SCNC: 4.7 MMOL/L (ref 3.5–5.3)
PROT SERPL-MCNC: 7 G/DL (ref 6.1–8.1)
RBC # BLD AUTO: 3.95 MILLION/UL (ref 3.8–5.1)
SODIUM SERPL-SCNC: 137 MMOL/L (ref 135–146)
TRIGL SERPL-MCNC: 65 MG/DL
TSH SERPL-ACNC: 2.07 MIU/L
VIT B12 SERPL-MCNC: 883 PG/ML (ref 200–1100)
WBC # BLD AUTO: 4.2 THOUSAND/UL (ref 3.8–10.8)

## 2025-08-14 ENCOUNTER — APPOINTMENT (OUTPATIENT)
Facility: CLINIC | Age: 30
End: 2025-08-14
Payer: COMMERCIAL

## 2025-08-14 VITALS
OXYGEN SATURATION: 93 % | HEIGHT: 68 IN | HEART RATE: 90 BPM | SYSTOLIC BLOOD PRESSURE: 106 MMHG | BODY MASS INDEX: 20.98 KG/M2 | DIASTOLIC BLOOD PRESSURE: 73 MMHG | RESPIRATION RATE: 18 BRPM | TEMPERATURE: 98 F | WEIGHT: 138.4 LBS

## 2025-08-14 DIAGNOSIS — F41.9 ANXIETY: ICD-10-CM

## 2025-08-14 DIAGNOSIS — G43.009 MIGRAINE WITHOUT AURA AND RESPONSIVE TO TREATMENT: ICD-10-CM

## 2025-08-14 DIAGNOSIS — Z91.011 DAIRY ALLERGY: ICD-10-CM

## 2025-08-14 DIAGNOSIS — Z23 NEED FOR DIPHTHERIA-TETANUS-PERTUSSIS (TDAP) VACCINE: ICD-10-CM

## 2025-08-14 DIAGNOSIS — E06.3 HASHIMOTO'S DISEASE: ICD-10-CM

## 2025-08-14 DIAGNOSIS — Z00.00 HEALTHCARE MAINTENANCE: Primary | ICD-10-CM

## 2025-08-14 PROCEDURE — 3008F BODY MASS INDEX DOCD: CPT | Performed by: FAMILY MEDICINE

## 2025-08-14 PROCEDURE — 99395 PREV VISIT EST AGE 18-39: CPT | Performed by: FAMILY MEDICINE

## 2025-08-14 PROCEDURE — 90471 IMMUNIZATION ADMIN: CPT | Performed by: FAMILY MEDICINE

## 2025-08-14 PROCEDURE — 90715 TDAP VACCINE 7 YRS/> IM: CPT | Performed by: FAMILY MEDICINE

## 2025-08-14 ASSESSMENT — ENCOUNTER SYMPTOMS
HEADACHES: 0
FATIGUE: 1
SHORTNESS OF BREATH: 0

## 2025-08-14 ASSESSMENT — PATIENT HEALTH QUESTIONNAIRE - PHQ9
1. LITTLE INTEREST OR PLEASURE IN DOING THINGS: NOT AT ALL
2. FEELING DOWN, DEPRESSED OR HOPELESS: NOT AT ALL
SUM OF ALL RESPONSES TO PHQ9 QUESTIONS 1 AND 2: 0

## 2025-09-22 ENCOUNTER — APPOINTMENT (OUTPATIENT)
Dept: ENDOCRINOLOGY | Facility: CLINIC | Age: 30
End: 2025-09-22
Payer: COMMERCIAL

## 2025-10-07 ENCOUNTER — APPOINTMENT (OUTPATIENT)
Dept: NEUROLOGY | Facility: CLINIC | Age: 30
End: 2025-10-07
Payer: COMMERCIAL

## 2026-08-14 ENCOUNTER — APPOINTMENT (OUTPATIENT)
Facility: CLINIC | Age: 31
End: 2026-08-14
Payer: COMMERCIAL